# Patient Record
Sex: MALE | Race: WHITE | NOT HISPANIC OR LATINO | Employment: FULL TIME | URBAN - METROPOLITAN AREA
[De-identification: names, ages, dates, MRNs, and addresses within clinical notes are randomized per-mention and may not be internally consistent; named-entity substitution may affect disease eponyms.]

---

## 2018-04-09 ENCOUNTER — TELEPHONE (OUTPATIENT)
Dept: OTHER | Facility: CLINIC | Age: 33
End: 2018-04-09

## 2018-04-09 NOTE — TELEPHONE ENCOUNTER
4/9/2018    Call Regarding Onboarding [facility] [place] [facility] Other    Attempt 1    Message on voicemail     Comments:       Outreach   SANAZ

## 2018-04-26 NOTE — TELEPHONE ENCOUNTER
4/26/2018    Call Regarding Onboarding [facility] other    Attempt 2    Message on voicemail    Comments:       Outreach   Sydney Hammond

## 2018-06-15 ENCOUNTER — OFFICE VISIT (OUTPATIENT)
Dept: FAMILY MEDICINE | Facility: CLINIC | Age: 33
End: 2018-06-15

## 2018-06-15 VITALS
HEART RATE: 104 BPM | SYSTOLIC BLOOD PRESSURE: 146 MMHG | DIASTOLIC BLOOD PRESSURE: 86 MMHG | WEIGHT: 194 LBS | OXYGEN SATURATION: 96 % | BODY MASS INDEX: 26.86 KG/M2 | RESPIRATION RATE: 16 BRPM

## 2018-06-15 DIAGNOSIS — E78.5 HYPERLIPIDEMIA LDL GOAL <130: ICD-10-CM

## 2018-06-15 DIAGNOSIS — M54.16 LUMBAR BACK PAIN WITH RADICULOPATHY AFFECTING LEFT LOWER EXTREMITY: Primary | ICD-10-CM

## 2018-06-15 DIAGNOSIS — F17.200 TOBACCO USE DISORDER: ICD-10-CM

## 2018-06-15 DIAGNOSIS — Z23 VACCINE FOR DIPHTHERIA-TETANUS: ICD-10-CM

## 2018-06-15 DIAGNOSIS — F41.9 ANXIETY: ICD-10-CM

## 2018-06-15 DIAGNOSIS — M54.50 ACUTE LEFT-SIDED LOW BACK PAIN WITHOUT SCIATICA: ICD-10-CM

## 2018-06-15 DIAGNOSIS — M62.830 BACK MUSCLE SPASM: ICD-10-CM

## 2018-06-15 DIAGNOSIS — M54.6 ACUTE MIDLINE THORACIC BACK PAIN: ICD-10-CM

## 2018-06-15 DIAGNOSIS — Z11.4 ENCOUNTER FOR SCREENING FOR HIV: ICD-10-CM

## 2018-06-15 LAB
% GRANULOCYTES: 76.4 % (ref 42.2–75.2)
ERYTHROCYTE [SEDIMENTATION RATE] IN BLOOD: 18 MM/HR (ref 0–15)
HCT VFR BLD AUTO: 44.3 % (ref 39–51)
HEMOGLOBIN: 15 G/DL (ref 13.4–17.5)
LYMPHOCYTES NFR BLD AUTO: 18.9 % (ref 20.5–51.1)
MCH RBC QN AUTO: 30.4 PG (ref 27–31)
MCHC RBC AUTO-ENTMCNC: 33.9 G/DL (ref 33–37)
MCV RBC AUTO: 89.6 FL (ref 80–100)
MONOCYTES NFR BLD AUTO: 4.7 % (ref 1.7–9.3)
PLATELET # BLD AUTO: 326 K/UL (ref 140–450)
RBC # BLD AUTO: 4.94 X10/CMM (ref 4.2–5.9)
WBC # BLD AUTO: 10.3 X10/CMM (ref 3.8–11)

## 2018-06-15 PROCEDURE — 85025 COMPLETE CBC W/AUTO DIFF WBC: CPT | Performed by: FAMILY MEDICINE

## 2018-06-15 PROCEDURE — 36415 COLL VENOUS BLD VENIPUNCTURE: CPT | Performed by: FAMILY MEDICINE

## 2018-06-15 PROCEDURE — 90471 IMMUNIZATION ADMIN: CPT | Performed by: FAMILY MEDICINE

## 2018-06-15 PROCEDURE — 85651 RBC SED RATE NONAUTOMATED: CPT | Performed by: FAMILY MEDICINE

## 2018-06-15 PROCEDURE — 99214 OFFICE O/P EST MOD 30 MIN: CPT | Mod: 25 | Performed by: FAMILY MEDICINE

## 2018-06-15 PROCEDURE — 90715 TDAP VACCINE 7 YRS/> IM: CPT | Performed by: FAMILY MEDICINE

## 2018-06-15 RX ORDER — ACETAMINOPHEN AND CODEINE PHOSPHATE 300; 30 MG/1; MG/1
2 TABLET ORAL EVERY 6 HOURS PRN
Qty: 28 TABLET | Refills: 0 | Status: SHIPPED | OUTPATIENT
Start: 2018-06-15 | End: 2022-08-30

## 2018-06-15 RX ORDER — CYCLOBENZAPRINE HCL 10 MG
10 TABLET ORAL
Qty: 30 TABLET | Refills: 0 | Status: SHIPPED | OUTPATIENT
Start: 2018-06-15 | End: 2022-08-30

## 2018-06-15 RX ORDER — PREDNISONE 20 MG/1
TABLET ORAL
Qty: 20 TABLET | Refills: 0 | Status: SHIPPED | OUTPATIENT
Start: 2018-06-15 | End: 2022-08-30

## 2018-06-15 RX ORDER — LORAZEPAM 0.5 MG/1
0.5 TABLET ORAL EVERY 6 HOURS PRN
Qty: 30 TABLET | Refills: 0 | Status: SHIPPED | OUTPATIENT
Start: 2018-06-15 | End: 2022-08-30

## 2018-06-15 NOTE — PROGRESS NOTES
Back pain      SUBJECTIVE:   Daniel Greene is a 33 year old male who presents to clinic today for the following health issues:    White male wearing a hearing device.  Job  [employer]    Sleep Not Good  Appetite Good  Exercise No, some stretching    Smoking Yes 1/2 ppd tried Chantix helped, willing to try this again. Ordered today. Call before starter pack runs out to get a script for the continuation pack.  ETOH once a week  Street drugs/MJ No  Caffeine Coffee    Depression No  Anxiety NO  Panic NO  SI/SP NO  Hallucinations NO  Paranoid NO  Manic NO  OCD NO  PTSD NO  Gambling NO  Guns NO    HCM due  HIV done today  Tetanus done today    Back Pain       Duration: Saturday onset. Burning pain. Constant.     Knot in midback. Hard to stand. Worsening and now lower left side where it was previous. No Trauma, no fall, no fever. Bowel and bladder ok.    Tried heat and ice.        Specific cause: none    Description:   Location of pain: middle of back left  Character of pain: Stabbing  Pain radiation:radiates into the left buttocks and radiates into the left leg to feet  New numbness or weakness in legs, not attributed to pain:  no     Intensity: Currently 5-6/10    History:   Pain interferes with job: YES, sit for most of the day  History of back problems: History of Back problems  Any previous MRI or X-rays: Yes.  Date MRI 10 years ago  Sees a specialist for back pain:  No  Therapies tried without relief: ibuprofen 800 mg TID with food and acetaminophen (Tylenol)    Alleviating factors:   Improved by: lying down helps.    Precipitating factors:  Worsened by: Lifting, Bending, Standing and Sitting    Functional and Psychosocial Screen (Haylee STarT Back):      Not performed today    Imaging over 15 years ago.  Past PT and injection.  Previous Back Clinic on [facility]    Hearing impaired with device.          Accompanying Signs & Symptoms:  Risk of Fracture:  None  Risk of Cauda Equina:  None  Risk of  Infection:  None  Risk of Cancer:  None  Risk of Ankylosing Spondylitis:  Onset at age <35, male, AND morning back stiffness. no             Depression no  Anxiety some  Panic no  SI/SP no  Hallucinations no  Paranoid no  Manic no  OCD no  PTSD no  Gambling no  Chem dep no  Guns no          Problem list and histories reviewed & adjusted, as indicated.  Additional history: as documented    Patient Active Problem List   Diagnosis     Health Care Home     Cholesteatoma of ear     Back pain     Hearing loss- left ear     Heartburn     Tobacco use     Past Surgical History:   Procedure Laterality Date     LASER DIODE TYMPANOMASTOIDECTOMY Right 11/4/2015    Procedure: LASER DIODE TYMPANOMASTOIDECTOMY;  Surgeon: Augustina Reilly MD;  Location: [facility]     MASTOID SURGERY Right 1995    [facility]     MASTOIDECTOMY Left 3/25/2015    Procedure: MASTOIDECTOMY;  Surgeon: Augustina Reilly MD;  Location:  OR       Social History   Substance Use Topics     Smoking status: Current Some Day Smoker     Smokeless tobacco: Not on file     Alcohol use 0.0 oz/week     0 Standard drinks or equivalent per week     No family history on file.      Current Outpatient Prescriptions   Medication Sig Dispense Refill     acetaminophen (TYLENOL) 325 MG tablet Take 325-650 mg by mouth every 6 hours as needed for mild pain       acetaminophen-codeine (TYLENOL W/CODEINE NO. 3) 300-30 MG per tablet Take 2 tablets by mouth every 6 hours as needed for pain 28 tablet 0     ASPIRIN PO Take 325 mg by mouth       cyclobenzaprine (FLEXERIL) 10 MG tablet Take 1 tablet (10 mg) by mouth nightly as needed for muscle spasms 14 tablet 0     LORazepam (ATIVAN) 0.5 MG tablet Take 1 tablet (0.5 mg) by mouth every 6 hours as needed for anxiety 30 tablet 0     varenicline (CHANTIX STARTING MONTH PAK) 0.5 MG X 11 & 1 MG X 42 tablet Take 0.5 mg tab daily for 3 days, then 0.5 mg tab twice daily for 4 days, then 1 mg twice daily. 53 tablet 0     No Known  "Allergies  No lab results found.   BP Readings from Last 3 Encounters:   06/15/18 146/86   08/12/16 (!) 146/100   11/04/15 118/73    Wt Readings from Last 3 Encounters:   06/15/18 88 kg (194 lb)   08/12/16 86.6 kg (191 lb)   11/04/15 85.8 kg (189 lb 2.5 oz)       Estimated body mass index is 26.86 kg/(m^2) as calculated from the following:    Height as of 11/4/15: 1.81 m (5' 11.26\").    Weight as of this encounter: 88 kg (194 lb).             Labs reviewed in EPIC    Reviewed and updated as needed this visit by clinical staff       Reviewed and updated as needed this visit by Provider         ROS:  Constitutional, HEENT, cardiovascular, pulmonary, GI, , musculoskeletal, neuro, skin, endocrine and psych systems are negative, except as otherwise noted.    OBJECTIVE:     /86  Pulse 104  Resp 16  Wt 88 kg (194 lb)  SpO2 96%  BMI 26.86 kg/m2  There is no height or weight on file to calculate BMI.  GENERAL: healthy, alert and no distress White male with hearing device  Push off from chair, stiffly stands uncomfortably for exam. Can get on/off the exam table without assistance. Gait in hallway not observed.  EYES: Eyes grossly normal to inspection, PERRL and conjunctivae and sclerae normal  HENT: ear canals and TM's normal, nose and mouth without ulcers or lesions  NECK: no adenopathy, no asymmetry, masses, or scars and thyroid normal to palpation  RESP: lungs clear to auscultation - no rales, rhonchi or wheezes  CV: regular rate and rhythm, normal S1 S2, no S3 or S4, no murmur, click or rub, no peripheral edema and peripheral pulses strong  ABDOMEN: soft, nontender, no hepatosplenomegaly, no masses and bowel sounds normal  MS: no gross new musculoskeletal defects noted except for back exam below, no edema  SKIN: no suspicious lesions or rashes  NEURO: Normal strength and tone, mentation intact and speech normal  PSYCH: mentation appears normal, affect normal/bright  LYMPH: no cervical, supraclavicular, " "axillary, or inguinal adenopathy  Back exam  Spine  Tender, no redness or warmth. Thoracic tender, then better low thoracic upper lumbar,  and then tender again low lumbar  ROM of back  Flexion good  Extension poor  Lateral bending better to the right then left  Twist fair  Straight leg raising positive on the left  DTRs present bilateral at knees  No sensation changes      Diagnostic Test Results:  Results for orders placed or performed during the hospital encounter of 11/04/15   Surgical pathology exam   Result Value Ref Range    Copath Report       Patient Name: ANGIE LICONA  MR#: [id number]  Specimen #: [id number]  Collected: 11/4/2015  Received: 11/4/2015  Reported: 11/9/2015 10:49  Ordering Phy(s): ONUR QUEEN    SPECIMEN(S):  Right mastoid contents    FINAL DIAGNOSIS:  RIGHT MASTOID CONTENTS, EXCISION:  - Fragments of keratinized stratified squamous epithelium and keratin  debris, consistent with keratoma (cholesteatoma).    I have personally reviewed all specimens and or slides, including the  listed special stains, and used them with my medical judgement to  determine the final diagnosis.    Electronically signed out by:    Yenifer Joiner M.D., [facility]    CLINICAL HISTORY:  Cholesteatoma    GROSS:  The specimen is received in formalin with proper patient identification,  labeled \"right mastoid contents\".  The specimen consists of multiple  fragments of friable gray to white tissue measuring 4.2 x 3.9 x 1.7 cm  in aggregate.  The fragments are serially sectioned and the specimen is  entirely submitted in ca ssettes 1-3. (Dictated by: Keshawn Queen  11/5/2015 11:39 AM)    MICROSCOPIC:  Microscopic examination is performed.    CPT Codes:  A: 25388-KZ3    TESTING LAB LOCATION:  [facility], [address]   [address]  [phone]    COLLECTION SITE:  Client: [facility], " [place]  Location: [facility] (B)         ASSESSMENT/PLAN:     ASSESSMENT / PLAN:  (M54.17) Lumbar back pain with radiculopathy affecting left lower extremity  (primary encounter diagnosis)  Comment:   Plan: MR Lumbar Spine w/o Contrast, predniSONE         (DELTASONE) 20 MG tablet, ToxASSURE Urine Drug         Screen ([facility]), CBC with Diff/Plt ([facility]), Sed         Rate Westergren ([facility]), C-Reactive Protein          Quant ([facility])            (F17.200) Tobacco use disorder  Comment:   Plan: varenicline (CHANTIX STARTING MONTH PAK) 0.5 MG        X 11 & 1 MG X 42 tablet            (M54.6) Acute midline thoracic back pain  Comment:  Plan: MR Thoracic Spine w/o Contrast, predniSONE         (DELTASONE) 20 MG tablet, ToxASSURE Urine Drug         Screen ([facility]), CBC with Diff/Plt ([facility]), Sed         Rate Westergren ([facility]), C-Reactive Protein          Quant ([facility])            (M54.5) Acute left-sided low back pain without sciatica  Comment:   Plan: acetaminophen-codeine (TYLENOL W/CODEINE NO. 3)        300-30 MG per tablet, cyclobenzaprine         (FLEXERIL) 10 MG tablet            (F41.9) Anxiety  Comment:   Plan: LORazepam (ATIVAN) 0.5 MG tablet        Contract today, toxassure    (Z68.26) BMI 26.0-26.9,adult  Comment: consider weight loss  Plan: Comp. Metabolic Panel (14) ([facility])            (Z23) Vaccine for diphtheria-tetanus  Comment: update  Plan: TDAP VACCINE (BOOSTRIX), ADMIN 1st VACCINE            (Z11.4) Encounter for screening for HIV  Comment: routine screen  Plan: HIV 1/0/2 Rflx ([facility])            (E78.5) Hyperlipidemia LDL goal <130  Comment: non fasting  Plan: Lipid Panel ([facility])            (M62.830) Back muscle spasm  Comment:   Plan: Flexeril        Patient Instructions   Schedule MRI thoracic and lumbar    Prednisone for inflammation. NO Ibuprofen or Alleve with this.  T#3 for pain  Flexeril for muscle spasm  Heat or Ice ok    Two weeks follow up    Tetanus vaccine    Labs today    Chantix for smoking  "cessation.   Smoking Cessation Ideas    1. Consider hiding your cigarettes in an inconvenient place like the freezer, the trunk of your car etc.    2. Consider using gum, candy, toothpicks instead. Many smokers are oral people who like things in their mouth and hands.    3. Consider using an elastic/rubber band and snapping it when you want a cigarette to remind you to pause and consider if you want to make the choice to smoke. This is also a negative feedback tool.    4. Consider cutting the cigarette in half.    5. Consider portioning out your allotment for the day in a ziplock bag so that you can see what your limit is for the day. Then put the pack somewhere inconvenient.    6. Set a quit date.    7. Consider when and where and who you smoke with. Consider changing those behaviors. For example \"everytime I have coffee, I smoke\" try and separate these events from each other.    8. Consider setting longer time limits before your first or next cigarette. You could use a timer if needed.                Liane Espino MD  [facility]    "

## 2018-06-15 NOTE — PATIENT INSTRUCTIONS
"Schedule MRI thoracic and lumbar    Prednisone for inflammation. NO Ibuprofen or Alleve with this.  T#3 for pain  Flexeril for muscle spasm  Heat or Ice ok    Two weeks follow up    Tetanus vaccine    Labs today    Chantix for smoking cessation.   Smoking Cessation Ideas    1. Consider hiding your cigarettes in an inconvenient place like the freezer, the trunk of your car etc.    2. Consider using gum, candy, toothpicks instead. Many smokers are oral people who like things in their mouth and hands.    3. Consider using an elastic/rubber band and snapping it when you want a cigarette to remind you to pause and consider if you want to make the choice to smoke. This is also a negative feedback tool.    4. Consider cutting the cigarette in half.    5. Consider portioning out your allotment for the day in a ziplock bag so that you can see what your limit is for the day. Then put the pack somewhere inconvenient.    6. Set a quit date.    7. Consider when and where and who you smoke with. Consider changing those behaviors. For example \"everytime I have coffee, I smoke\" try and separate these events from each other.    8. Consider setting longer time limits before your first or next cigarette. You could use a timer if needed.            "

## 2018-06-15 NOTE — MR AVS SNAPSHOT
"              After Visit Summary   6/15/2018    Daniel Greene    MRN: [id number]           Patient Information     Date Of Birth          1985        Visit Information        Provider Department      6/15/2018 1:45 PM Liane Espino MD [facility]        Today's Diagnoses     Lumbar back pain with radiculopathy affecting left lower extremity    -  1    Tobacco use disorder        Acute midline thoracic back pain        Acute left-sided low back pain without sciatica        Anxiety        BMI 26.0-26.9,adult        Vaccine for diphtheria-tetanus        Encounter for screening for HIV        Hyperlipidemia LDL goal <130        Back muscle spasm          Care Instructions    Schedule MRI thoracic and lumbar    Prednisone for inflammation. NO Ibuprofen or Alleve with this.  T#3 for pain  Flexeril for muscle spasm  Heat or Ice ok    Two weeks follow up    Tetanus vaccine    Labs today    Chantix for smoking cessation.   Smoking Cessation Ideas    1. Consider hiding your cigarettes in an inconvenient place like the freezer, the trunk of your car etc.    2. Consider using gum, candy, toothpicks instead. Many smokers are oral people who like things in their mouth and hands.    3. Consider using an elastic/rubber band and snapping it when you want a cigarette to remind you to pause and consider if you want to make the choice to smoke. This is also a negative feedback tool.    4. Consider cutting the cigarette in half.    5. Consider portioning out your allotment for the day in a ziplock bag so that you can see what your limit is for the day. Then put the pack somewhere inconvenient.    6. Set a quit date.    7. Consider when and where and who you smoke with. Consider changing those behaviors. For example \"everytime I have coffee, I smoke\" try and separate these events from each other.    8. Consider setting longer time limits before your first or next cigarette. You could use a timer if " needed.                    Follow-ups after your visit        Who to contact     If you have questions or need follow up information about today's clinic visit or your schedule please contact [facility] directly at [phone].  Normal or non-critical lab and imaging results will be communicated to you by MyChart, letter or phone within 4 business days after the clinic has received the results. If you do not hear from us within 7 days, please contact the clinic through [employer] or phone. If you have a critical or abnormal lab result, we will notify you by phone as soon as possible.  Submit refill requests through [employer] or call your pharmacy and they will forward the refill request to us. Please allow 3 business days for your refill to be completed.          Additional Information About Your Visit        [employer] Information     [employer] gives you secure access to your electronic health record. If you see a primary care provider, you can also send messages to your care team and make appointments. If you have questions, please call your primary care clinic.  If you do not have a primary care provider, please call [phone] and they will assist you.        Care EveryWhere ID     This is your Care EveryWhere ID. This could be used by other organizations to access your [facility] medical records  [id number]        Your Vitals Were     Pulse Respirations Pulse Oximetry BMI (Body Mass Index)          104 16 96% 26.86 kg/m2         Blood Pressure from Last 3 Encounters:   06/15/18 146/86   08/12/16 (!) 146/100   11/04/15 118/73    Weight from Last 3 Encounters:   06/15/18 88 kg (194 lb)   08/12/16 86.6 kg (191 lb)   11/04/15 85.8 kg (189 lb 2.5 oz)              We Performed the Following     ADMIN 1st VACCINE     C-Reactive Protein  Quant ([facility])     CBC with Diff/Plt ([facility])     Comp. Metabolic Panel (14) ([facility])     HIV 1/0/2 Rflx ([facility])     Lipid Panel ([facility])     Sed Rate Westergren ([facility])     TDAP  VACCINE (BOOSTRIX)     ToxASSURE Urine Drug Screen ([facility])          Today's Medication Changes          These changes are accurate as of 6/15/18  2:21 PM.  If you have any questions, ask your nurse or doctor.               Start taking these medicines.        Dose/Directions    predniSONE 20 MG tablet   Commonly known as:  DELTASONE   Used for:  Lumbar back pain with radiculopathy affecting left lower extremity, Acute midline thoracic back pain   Started by:  Liane Espino MD        Take 3 tabs (60 mg) by mouth daily x 3 days, 2 tabs (40 mg) daily x 3 days, 1 tab (20 mg) daily x 3 days, then 1/2 tab (10 mg) x 3 days.   Quantity:  20 tablet   Refills:  0            Where to get your medicines      These medications were sent to [facility] [address] [place] [address] & [address], [place] [address]     Phone:  [phone]     cyclobenzaprine 10 MG tablet    predniSONE 20 MG tablet    varenicline 0.5 MG X 11 & 1 MG X 42 tablet         Some of these will need a paper prescription and others can be bought over the counter.  Ask your nurse if you have questions.     Bring a paper prescription for each of these medications     acetaminophen-codeine 300-30 MG per tablet    LORazepam 0.5 MG tablet               Information about OPIOIDS     PRESCRIPTION OPIOIDS: WHAT YOU NEED TO KNOW   We gave you an opioid (narcotic) pain medicine. It is important to manage your pain, but opioids are not always the best choice. You should first try all the other options your care team gave you. Take this medicine for as short a time (and as few doses) as possible.     These medicines have risks:    DO NOT drive when on new or higher doses of pain medicine. These medicines can affect your alertness and reaction times, and you could be arrested for driving under the influence (DUI). If you need to use opioids long-term, talk to your care team about driving.    DO NOT operate  heave machinery    DO NOT do any other dangerous activities while taking these medicines.     DO NOT drink any alcohol while taking these medicines.      If the opioid prescribed includes acetaminophen, DO NOT take with any other medicines that contain acetaminophen. Read all labels carefully. Look for the word  acetaminophen  or  Tylenol.  Ask your pharmacist if you have questions or are unsure.    You can get addicted to pain medicines, especially if you have a history of addiction (chemical, alcohol or substance dependence). Talk to your care team about ways to reduce this risk.    Store your pills in a secure place, locked if possible. We will not replace any lost or stolen medicine. If you don t finish your medicine, please throw away (dispose) as directed by your pharmacist. The [place] Pollution Control Agency has more information about safe disposal: https://www.pca.[facility].[place].us/living-green/managing-unwanted-medications.     All opioids tend to cause constipation. Drink plenty of water and eat foods that have a lot of fiber, such as fruits, vegetables, prune juice, apple juice and high-fiber cereal. Take a laxative (Miralax, milk of magnesia, Colace, Senna) if you don t move your bowels at least every other day.          Primary Care Provider Office Phone # Fax #    Rasta Patton [phone] [address]  [facility] [address]        Equal Access to Services     BRYANNA AUSTIN AH: Hadii aad ku hadasho Soomaali, waaxda luqadaha, qaybta kaalmada adeegyada, vitor gonzalez hayalphonso francisco. So [facility] [phone].    ATENCIÓN: Si habla español, tiene a macias disposición servicios gratuitos de asistencia lingüística. Llame al [phone].    We comply with applicable federal civil rights laws and [place] laws. We do not discriminate on the basis of race, color, national origin, age, disability, sex, sexual orientation, or gender identity.            Thank you!     Thank you  for choosing [facility]  for your care. Our goal is always to provide you with excellent care. Hearing back from our patients is one way we can continue to improve our services. Please take a few minutes to complete the written survey that you may receive in the mail after your visit with us. Thank you!             Your Updated Medication List - Protect others around you: Learn how to safely use, store and throw away your medicines at www.disposemymeds.org.          This list is accurate as of 6/15/18  2:21 PM.  Always use your most recent med list.                   Brand Name Dispense Instructions for use Diagnosis    acetaminophen-codeine 300-30 MG per tablet    TYLENOL w/CODEINE No. 3    28 tablet    Take 2 tablets by mouth every 6 hours as needed for pain    Acute left-sided low back pain without sciatica       ASPIRIN PO      Take 325 mg by mouth        cyclobenzaprine 10 MG tablet    FLEXERIL    30 tablet    Take 1 tablet (10 mg) by mouth nightly as needed for muscle spasms    Acute left-sided low back pain without sciatica       LORazepam 0.5 MG tablet    ATIVAN    30 tablet    Take 1 tablet (0.5 mg) by mouth every 6 hours as needed for anxiety    Anxiety       predniSONE 20 MG tablet    DELTASONE    20 tablet    Take 3 tabs (60 mg) by mouth daily x 3 days, 2 tabs (40 mg) daily x 3 days, 1 tab (20 mg) daily x 3 days, then 1/2 tab (10 mg) x 3 days.    Lumbar back pain with radiculopathy affecting left lower extremity, Acute midline thoracic back pain       TYLENOL 325 MG tablet   Generic drug:  acetaminophen      Take 325-650 mg by mouth every 6 hours as needed for mild pain        varenicline 0.5 MG X 11 & 1 MG X 42 tablet    CHANTIX STARTING MONTH ARNOL    53 tablet    Take 0.5 mg tab daily for 3 days, then 0.5 mg tab twice daily for 4 days, then 1 mg twice daily.    Tobacco use disorder

## 2018-06-15 NOTE — LETTER
[facility]    06/15/18    Patient: Daniel Greene  YOB: 1985  Medical Record Number: [id number]                                                                  Controlled Substance Agreement  I understand that my care provider has prescribed controlled substances (narcotics, tranquilizers, and/or stimulants) to help manage my condition(s).  I am taking this medicine to help me function or work.  I know that this is strong medicine.  It could have serious side effects and even cause a dependency on the drug.  If I stop these medicines suddenly, I could have severe withdrawal symptoms.    The risks, benefits, and side effects of these medication(s) were explained to me.  I agree that:  1. I will take part in other treatments as advised by my provider.  This may be psychiatry or counseling, physical therapy, behavioral therapy, group treatment, or a referral to a pain clinic.  I will reduce or stop my medicine when my provider tells me to do so.   2. I will take my medicines as prescribed.  I will not change the dose or schedule unless my provider tells me to.  There will be no refills if I  run out early.   I may be contacted at any time without warning and asked to complete a drug test or pill count.   3. I will keep all my appointments at the clinic.  If I miss appointments or fail to follow instructions, my provider may stop my medicine.  4. I will not ask other providers to prescribe controlled substances. And I will not accept controlled substances from other people. If I need another prescribed controlled substance for a new reason, I will notify my provider within one business day.  5. If I enroll in the [place] Medical Marijuana program, I will tell my provider.  I will also sign an agreement to share my medical records with my provider.  6. I will use one pharmacy to fill all of my controlled substance prescriptions.  If my prescription is mailed to my pharmacy, it may take 5 to 7  days for my medicine to be ready.  7. I understand that my provider, clinic care team, and pharmacy can track controlled substance prescriptions from other providers through a central database (prescription monitoring program).  8. I will bring in my list of medications (or my medicine bottles) each time I come to the clinic.  REV-  04/2016                                                                                                                                            Page 1 of 2      [facility]    06/15/18    Patient: Daniel Greene  YOB: 1985  Medical Record Number: [id number]    9. Refills of controlled substances will be made only during office hours.  It is up to me to make sure that I do not run out of my medicines on weekends or holidays.    10. I am responsible for my prescriptions.  If the medicine is lost or stolen, it will not be replaced.   I also agree not to share these medicines with anyone.  11. I agree to not use ANY illegal or recreational drugs.  This includes marijuana, cocaine, bath salts or other drugs.  I agree not to use alcohol unless my provider says I may.  I agree to give urine samples whenever asked.  If I fail to give a urine sample, the provider may stop my medicine.     12. I will tell my nurse or provider right away if I become pregnant or have a new medical problem treated outside of [facility].  13. I understand that this medicine can affect my thinking and judgment.  It may be unsafe for me to drive, use machinery and do dangerous tasks.  I will not do any of these things until I know how the medicine affects me.  If my dose changes, I will wait to see how it affects me.  I will contact my provider if I have concerns about medicine side effects.  I understand that if I do not follow any of the conditions above, my prescriptions or treatment may be stopped.    I agree that my provider, clinic care team, and pharmacy may work with any [facility],  state or federal law enforcement agency that investigates the misuse, sale, or other diversion of my controlled medicine. I will allow my provider to discuss my care with or share a copy of this agreement with any other treating provider, pharmacy or emergency room where I receive care.  I agree to give up (waive) any right of privacy or confidentiality with respect to these authorizations.   I have read this agreement and have asked questions about anything I did not understand.   ___________________________________    ___________________________  Patient Signature                                                           Date and Time  ___________________________________     ____________________________  Witness                                                                            Date and Time  ___________________________________  Liane Espino MD  REV-  04/2016                                                                                                                                                                 Page 2 of 2

## 2018-06-16 LAB
ALBUMIN SERPL-MCNC: 5 G/DL (ref 3.5–5.5)
ALBUMIN/GLOB SERPL: 1.7 {RATIO} (ref 1.2–2.2)
ALP SERPL-CCNC: 95 IU/L (ref 39–117)
ALT SERPL-CCNC: 125 IU/L (ref 0–44)
AST SERPL-CCNC: 57 IU/L (ref 0–40)
BILIRUB SERPL-MCNC: 0.4 MG/DL (ref 0–1.2)
BUN SERPL-MCNC: 12 MG/DL (ref 6–20)
BUN/CREATININE RATIO: 14 (ref 9–20)
CALCIUM SERPL-MCNC: 10.3 MG/DL (ref 8.7–10.2)
CHLORIDE SERPLBLD-SCNC: 97 MMOL/L (ref 96–106)
CHOLEST SERPL-MCNC: 238 MG/DL (ref 100–199)
CREAT SERPL-MCNC: 0.84 MG/DL (ref 0.76–1.27)
CRP SERPL-MCNC: 12.6 MG/L (ref 0–4.9)
EGFR IF AFRICN AM: 132 ML/MIN/1.73
EGFR IF NONAFRICN AM: 114 ML/MIN/1.73
GLOBULIN, TOTAL: 3 G/DL (ref 1.5–4.5)
GLUCOSE SERPL-MCNC: 97 MG/DL (ref 65–99)
HDLC SERPL-MCNC: 61 MG/DL
HIV SCREEN 4TH GEN WITH RFLX: NON REACTIVE
LDL/HDL RATIO: 2.1 RATIO (ref 0–3.6)
LDLC SERPL CALC-MCNC: 131 MG/DL (ref 0–99)
POTASSIUM SERPL-SCNC: 4 MMOL/L (ref 3.5–5.2)
PROT SERPL-MCNC: 8 G/DL (ref 6–8.5)
SODIUM SERPL-SCNC: 137 MMOL/L (ref 134–144)
TOTAL CO2: 22 MMOL/L (ref 20–29)
TRIGL SERPL-MCNC: 228 MG/DL (ref 0–149)
VLDLC SERPL CALC-MCNC: 46 MG/DL (ref 5–40)

## 2018-06-18 NOTE — PROGRESS NOTES
Cbc was ok with minor changes.  SED rate and CRP are  non specific inflammation test was mildly elevated.  HIV was negative.  CMP was ok. Slight increased liver tests and calcium.  Avoid alcohol and NSAIDS like Ibuprofen/Alleve.  Lipids are high and need work. Eat more fish, fish oil, ground flax seed and oatmeal. Eat less sugars. Recheck fasting in 3 months.

## 2018-06-20 LAB — SUMMARY OF DRUGS IDENTIFIED: NORMAL

## 2018-07-03 NOTE — PROGRESS NOTES
"  SUBJECTIVE:   Daniel Greene is a 33 year old male who presents to clinic today for the following health issues:  Hearing device as before    Back pain is good  Prednisone ended last Thur/Fri  Pain 0/10  \"Can touch toes again\"  No PT  Discussed proper lifting and not overdoing it. He turned down helping a friend move a bed.    Sleep ok  Appetite ok  Exercise walking 30-45 min  Standing all day some soreness    Fall/Seizure/LOC no    Smoking yes today<1/2 pk Chantix started this morning Planning on quitting.  ETOH happy hour 4, beer varies occasional 6 pk (try to stay a 3 per day)(discussed empty calories of alcohol- he is trying to lose weight)  Street drugs/MJ no  Caffeine yes coffee    Depression no  Anxiety no  Panic some times in a crowd, sometimes in the car  SI/SP no  Hallucinations no  Paranoid no  Manic no  OCD no  PTSD no   Gambling no  Guns no    Cough occasional   ROS otherwise negative    Problem list and histories reviewed & adjusted, as indicated.  Additional history: as documented    Patient Active Problem List   Diagnosis     Health Care Home     Cholesteatoma of ear     Back pain     Hearing loss- left ear     Heartburn     Tobacco use     Past Surgical History:   Procedure Laterality Date     LASER DIODE TYMPANOMASTOIDECTOMY Right 11/4/2015    Procedure: LASER DIODE TYMPANOMASTOIDECTOMY;  Surgeon: Augustina Reilly MD;  Location: [facility] OR     MASTOID SURGERY Right 1995    [facility]     MASTOIDECTOMY Left 3/25/2015    Procedure: MASTOIDECTOMY;  Surgeon: Augustina Rielly MD;  Location:  OR       Social History   Substance Use Topics     Smoking status: Current Some Day Smoker     Smokeless tobacco: Never Used     Alcohol use 0.0 oz/week     0 Standard drinks or equivalent per week     No family history on file.      Current Outpatient Prescriptions   Medication Sig Dispense Refill     acetaminophen (TYLENOL) 325 MG tablet Take 325-650 mg by mouth every 6 hours as needed for mild pain       " ASPIRIN PO Take 325 mg by mouth       cyclobenzaprine (FLEXERIL) 10 MG tablet Take 1 tablet (10 mg) by mouth nightly as needed for muscle spasms 30 tablet 0     LORazepam (ATIVAN) 0.5 MG tablet Take 1 tablet (0.5 mg) by mouth every 6 hours as needed for anxiety 30 tablet 0     varenicline (CHANTIX STARTING MONTH ARNOL) 0.5 MG X 11 & 1 MG X 42 tablet Take 0.5 mg tab daily for 3 days, then 0.5 mg tab twice daily for 4 days, then 1 mg twice daily. 53 tablet 0     acetaminophen-codeine (TYLENOL W/CODEINE NO. 3) 300-30 MG per tablet Take 2 tablets by mouth every 6 hours as needed for pain (Patient not taking: Reported on 7/6/2018) 28 tablet 0     predniSONE (DELTASONE) 20 MG tablet Take 3 tabs (60 mg) by mouth daily x 3 days, 2 tabs (40 mg) daily x 3 days, 1 tab (20 mg) daily x 3 days, then 1/2 tab (10 mg) x 3 days. 20 tablet 0     No Known Allergies  Recent Labs   Lab Test  06/15/18   1503   LDL  131*   HDL  61   TRIG  228*   ALT  125*   CR  0.84   POTASSIUM  4.0      BP Readings from Last 3 Encounters:   07/06/18 136/82   06/15/18 146/86   08/12/16 (!) 146/100    Wt Readings from Last 3 Encounters:   07/06/18 92.1 kg (203 lb)   06/15/18 88 kg (194 lb)   08/12/16 86.6 kg (191 lb)                  Labs reviewed in EPIC    Reviewed and updated as needed this visit by clinical staff       Reviewed and updated as needed this visit by Provider         ROS:  Constitutional, HEENT, cardiovascular, pulmonary, GI, , musculoskeletal, neuro, skin, endocrine and psych systems are negative, except as otherwise noted.    OBJECTIVE:     /82  Pulse 88  Resp 16  Wt 92.1 kg (203 lb)  BMI 28.11 kg/m2  Body mass index is 28.11 kg/(m^2).  GENERAL: healthy, alert and no distress  EYES: Eyes grossly normal to inspection, PERRL and conjunctivae and sclerae normal  HENT: ear canals and TM's normal, nose and mouth without ulcers or lesions  RESP: lungs clear to auscultation - no rales, rhonchi or wheezes  CV: regular rate and rhythm,  normal S1 S2, no S3 or S4, no murmur, click or rub, no peripheral edema and peripheral pulses strong  ABDOMEN: soft, nontender, no hepatosplenomegaly, no masses and bowel sounds normal  MS: no gross musculoskeletal defects noted, no edema  SKIN: no suspicious lesions or rashes  NEURO: Normal strength and tone, mentation intact and speech normal  PSYCH: mentation appears normal, affect normal/bright  LYMPH: no cervical, supraclavicular, axillary, or inguinal adenopathy    Diagnostic Test Results:  Results for orders placed or performed in visit on 06/15/18   HIV 1/0/2 Rflx ([facility])   Result Value Ref Range    HIV Screen 4th Gen with Rflx Non Reactive Non Reactive    Narrative    Performed at:  01 - [facility] [address], [place], [place]  [id number]  : José Miguel William MD, Phone:  [phone]   ToxASSURE Urine Drug Screen ([facility])   Result Value Ref Range    Summary of Drugs Identified FINAL     Narrative    Performed at:  01  [facility]  [address] [place] [address]  [id number]  : Kade Javier MD, Phone:  [phone]   Comp. Metabolic Panel (14) ([facility])   Result Value Ref Range    Glucose 97 65 - 99 mg/dL    Urea Nitrogen 12 6 - 20 mg/dL    Creatinine 0.84 0.76 - 1.27 mg/dL    eGFR If NonAfricn Am 114 >59 mL/min/1.73    eGFR If Africn Am 132 >59 mL/min/1.73    BUN/Creatinine Ratio 14 9 - 20    Sodium 137 134 - 144 mmol/L    Potassium 4.0 3.5 - 5.2 mmol/L    Chloride 97 96 - 106 mmol/L    Total CO2 22 20 - 29 mmol/L    Calcium 10.3 (H) 8.7 - 10.2 mg/dL    Protein Total 8.0 6.0 - 8.5 g/dL    Albumin 5.0 3.5 - 5.5 g/dL    Globulin, Total 3.0 1.5 - 4.5 g/dL    A/G Ratio 1.7 1.2 - 2.2    Bilirubin Total 0.4 0.0 - 1.2 mg/dL    Alkaline Phosphatase 95 39 - 117 IU/L    AST 57 (H) 0 - 40 IU/L     (H) 0 - 44 IU/L    Narrative    Performed at:  [facility]  [address] [facility], [place], [place]  [id number]  : José Miguel William MD, Phone:  [phone]   Lipid Panel  "([facility])   Result Value Ref Range    Cholesterol 238 (H) 100 - 199 mg/dL    Triglycerides 228 (H) 0 - 149 mg/dL    HDL Cholesterol 61 >39 mg/dL    VLDL Cholesterol Marc 46 (H) 5 - 40 mg/dL    LDL Cholesterol Calculated 131 (H) 0 - 99 mg/dL    LDL/HDL Ratio 2.1 0.0 - 3.6 ratio    Narrative    Performed at:  01  [facility] [address], [place], [place]  [id number]  : José Miguel William MD, Phone:  [phone]   CBC with Diff/Plt ([facility])   Result Value Ref Range    WBC x10/cmm 10.3 3.8 - 11.0 x10/cmm    % Lymphocytes 18.9 (A) 20.5 - 51.1 %    % Monocytes 4.7 1.7 - 9.3 %    % Granulocytes 76.4 (A) 42.2 - 75.2 %    RBC x10/cmm 4.94 4.2 - 5.9 x10/cmm    Hemoglobin 15.0 13.4 - 17.5 g/dl    Hematocrit 44.3 39 - 51 %    MCV 89.6 80 - 100 fL    MCH 30.4 27.0 - 31.0 pg    MCHC 33.9 33.0 - 37.0 g/dL    Platelet Count 326 140 - 450 K/uL   Sed Rate Westergren ([facility])   Result Value Ref Range    Sed Rate 18 (A) 0 - 15 mm/hr   C-Reactive Protein  Quant ([facility])   Result Value Ref Range    C-Reactive Protein 12.6 (H) 0.0 - 4.9 mg/L    Narrative    Performed at:  01 - [facility] [address], [place], [place]  [id number]  : José Miguel William MD, Phone:  [phone]       ASSESSMENT/PLAN:     ASSESSMENT / PLAN:  (Z87.39) History of back pain  (primary encounter diagnosis)  Comment: Pain has resolved with prednisone  Plan: back care maintenance discussed    (F17.200) Tobacco use disorder  Comment:   Plan: started his Chantix today    Estimated body mass index is 28.11 kg/(m^2) as calculated from the following:    Height as of 11/4/15: 1.81 m (5' 11.26\").    Weight as of this encounter: 92.1 kg (203 lb).  Planning weight loss        Patient Instructions     Smoking cessation    Watch lifting mechanics    Continue stretching    Weight loss  Diet more salad, chicken etc  Wt Readings from Last 4 Encounters:   07/06/18 92.1 kg (203 lb)   06/15/18 88 kg (194 lb)   08/12/16 86.6 kg (191 lb)   11/04/15 85.8 kg (189 lb " "2.5 oz)     Estimated body mass index is 28.11 kg/(m^2) as calculated from the following:    Height as of 11/4/15: 1.81 m (5' 11.26\").    Weight as of this encounter: 92.1 kg (203 lb).    He will f/u 3 months  Liane Espino MD  [facility]    "

## 2018-07-06 ENCOUNTER — OFFICE VISIT (OUTPATIENT)
Dept: FAMILY MEDICINE | Facility: CLINIC | Age: 33
End: 2018-07-06

## 2018-07-06 VITALS
HEART RATE: 88 BPM | DIASTOLIC BLOOD PRESSURE: 82 MMHG | SYSTOLIC BLOOD PRESSURE: 136 MMHG | BODY MASS INDEX: 28.11 KG/M2 | WEIGHT: 203 LBS | RESPIRATION RATE: 16 BRPM

## 2018-07-06 DIAGNOSIS — F17.200 TOBACCO USE DISORDER: ICD-10-CM

## 2018-07-06 DIAGNOSIS — Z87.39 HISTORY OF BACK PAIN: Primary | ICD-10-CM

## 2018-07-06 PROCEDURE — 99214 OFFICE O/P EST MOD 30 MIN: CPT | Performed by: FAMILY MEDICINE

## 2018-07-06 NOTE — PATIENT INSTRUCTIONS
"Smoking cessation    Watch lifting mechanics    Continue stretching    Weight loss  Diet more salad, chicken etc  Wt Readings from Last 4 Encounters:   07/06/18 92.1 kg (203 lb)   06/15/18 88 kg (194 lb)   08/12/16 86.6 kg (191 lb)   11/04/15 85.8 kg (189 lb 2.5 oz)     Estimated body mass index is 28.11 kg/(m^2) as calculated from the following:    Height as of 11/4/15: 1.81 m (5' 11.26\").    Weight as of this encounter: 92.1 kg (203 lb).    "

## 2018-07-06 NOTE — MR AVS SNAPSHOT
"              After Visit Summary   7/6/2018    Daniel Greene    MRN: [id number]           Patient Information     Date Of Birth          1985        Visit Information        Provider Department      7/6/2018 3:30 PM Liane Espino MD [facility]        Care Instructions    Smoking cessation    Watch lifting mechanics    Continue stretching    Weight loss  Diet more salad, chicken etc  Wt Readings from Last 4 Encounters:   07/06/18 92.1 kg (203 lb)   06/15/18 88 kg (194 lb)   08/12/16 86.6 kg (191 lb)   11/04/15 85.8 kg (189 lb 2.5 oz)     Estimated body mass index is 28.11 kg/(m^2) as calculated from the following:    Height as of 11/4/15: 1.81 m (5' 11.26\").    Weight as of this encounter: 92.1 kg (203 lb).            Follow-ups after your visit        Who to contact     If you have questions or need follow up information about today's clinic visit or your schedule please contact [facility] directly at [phone].  Normal or non-critical lab and imaging results will be communicated to you by [facility], letter or phone within 4 business days after the clinic has received the results. If you do not hear from us within 7 days, please contact the clinic through [facility] or phone. If you have a critical or abnormal lab result, we will notify you by phone as soon as possible.  Submit refill requests through [facility] or call your pharmacy and they will forward the refill request to us. Please allow 3 business days for your refill to be completed.          Additional Information About Your Visit        [employer] Information     [facility] gives you secure access to your electronic health record. If you see a primary care provider, you can also send messages to your care team and make appointments. If you have questions, please call your primary care clinic.  If you do not have a primary care provider, please call [phone] and they will assist you.        Care EveryWhere ID     This is your " Care EveryWhere ID. This could be used by other organizations to access your [place] medical records  [id number]        Your Vitals Were     Pulse Respirations BMI (Body Mass Index)             88 16 28.11 kg/m2          Blood Pressure from Last 3 Encounters:   07/06/18 136/82   06/15/18 146/86   08/12/16 (!) 146/100    Weight from Last 3 Encounters:   07/06/18 92.1 kg (203 lb)   06/15/18 88 kg (194 lb)   08/12/16 86.6 kg (191 lb)              Today, you had the following     No orders found for display       Primary Care Provider Office Phone # Fax #    Liane Espino [phone] [address]  [facility] [address]        Equal Access to Services     BRYANNA AUSTIN : Hadii ish hammond hadasho Soomaali, waaxda luqadaha, qaybta kaalmada adeegyada, vitor gonzalez hayalphonso pope . So [facility] [phone].    ATENCIÓN: Si habla español, tiene a macias disposición servicios gratuitos de asistencia lingüística. Llame al [phone].    We comply with applicable federal civil rights laws and [place] laws. We do not discriminate on the basis of race, color, national origin, age, disability, sex, sexual orientation, or gender identity.            Thank you!     Thank you for choosing [facility]  for your care. Our goal is always to provide you with excellent care. Hearing back from our patients is one way we can continue to improve our services. Please take a few minutes to complete the written survey that you may receive in the mail after your visit with us. Thank you!             Your Updated Medication List - Protect others around you: Learn how to safely use, store and throw away your medicines at www.disposemymeds.org.          This list is accurate as of 7/6/18  3:44 PM.  Always use your most recent med list.                   Brand Name Dispense Instructions for use Diagnosis    acetaminophen-codeine 300-30 MG per tablet    TYLENOL w/CODEINE No. 3    28 tablet    Take 2  tablets by mouth every 6 hours as needed for pain    Acute left-sided low back pain without sciatica       ASPIRIN PO      Take 325 mg by mouth        cyclobenzaprine 10 MG tablet    FLEXERIL    30 tablet    Take 1 tablet (10 mg) by mouth nightly as needed for muscle spasms    Acute left-sided low back pain without sciatica       LORazepam 0.5 MG tablet    ATIVAN    30 tablet    Take 1 tablet (0.5 mg) by mouth every 6 hours as needed for anxiety    Anxiety       predniSONE 20 MG tablet    DELTASONE    20 tablet    Take 3 tabs (60 mg) by mouth daily x 3 days, 2 tabs (40 mg) daily x 3 days, 1 tab (20 mg) daily x 3 days, then 1/2 tab (10 mg) x 3 days.    Lumbar back pain with radiculopathy affecting left lower extremity, Acute midline thoracic back pain       TYLENOL 325 MG tablet   Generic drug:  acetaminophen      Take 325-650 mg by mouth every 6 hours as needed for mild pain        varenicline 0.5 MG X 11 & 1 MG X 42 tablet    CHANTIX STARTING MONTH ARNOL    53 tablet    Take 0.5 mg tab daily for 3 days, then 0.5 mg tab twice daily for 4 days, then 1 mg twice daily.    Tobacco use disorder

## 2018-11-27 ENCOUNTER — OFFICE VISIT (OUTPATIENT)
Dept: FAMILY MEDICINE | Facility: CLINIC | Age: 33
End: 2018-11-27

## 2018-11-27 VITALS
RESPIRATION RATE: 16 BRPM | BODY MASS INDEX: 27.83 KG/M2 | WEIGHT: 201 LBS | DIASTOLIC BLOOD PRESSURE: 68 MMHG | OXYGEN SATURATION: 97 % | HEART RATE: 104 BPM | SYSTOLIC BLOOD PRESSURE: 134 MMHG

## 2018-11-27 DIAGNOSIS — H60.392 OTHER INFECTIVE ACUTE OTITIS EXTERNA OF LEFT EAR: Primary | ICD-10-CM

## 2018-11-27 PROCEDURE — 99213 OFFICE O/P EST LOW 20 MIN: CPT | Performed by: NURSE PRACTITIONER

## 2018-11-27 RX ORDER — CIPROFLOXACIN AND DEXAMETHASONE 3; 1 MG/ML; MG/ML
4 SUSPENSION/ DROPS AURICULAR (OTIC) 2 TIMES DAILY
Qty: 2.8 ML | Refills: 0 | Status: SHIPPED | OUTPATIENT
Start: 2018-11-27 | End: 2018-12-04

## 2018-11-27 NOTE — PROGRESS NOTES
SUBJECTIVE:   Daniel Greene is a 33 year old male who presents to clinic today for the following health issues:  Left ear pain since Sunday, getting worse. Hearing worse than usual.      Concern - ear pain  Onset: Sunday morning    Description:   Left ear    Intensity: moderate, severe    Progression of Symptoms:  worsening    Accompanying Signs & Symptoms:  none    Previous history of similar problem:   none    Precipitating factors:   Worsened by: none    Alleviating factors:  Improved by: none    Therapies Tried and outcome: none      Problem list and histories reviewed & adjusted, as indicated.  Additional history: as documented    Labs reviewed in EPIC    Reviewed and updated as needed this visit by clinical staff       Reviewed and updated as needed this visit by Provider         ROS:  Constitutional, HEENT, cardiovascular, pulmonary, gi and gu systems are negative, except as otherwise noted.    OBJECTIVE:     /68  Pulse 104  Resp 16  Wt 91.2 kg (201 lb)  SpO2 97%  BMI 27.83 kg/m2  Body mass index is 27.83 kg/(m^2).  GENERAL: healthy, alert and mild distress  EYES: Eyes grossly normal to inspection, PERRL and conjunctivae and sclerae normal  HENT: normal cephalic/atraumatic, right ear: normal: no effusions, no erythema, normal landmarks, left ear: purulent drainage in canal, nose and mouth without ulcers or lesions, oropharynx clear and oral mucous membranes moist  SKIN: no suspicious lesions or rashes  PSYCH: mentation appears normal, affect normal/bright    Diagnostic Test Results:  none     ASSESSMENT/PLAN:         1. Other infective acute otitis externa of left ear    - ciprofloxacin-dexamethasone (CIPRODEX) 0.3-0.1 % otic suspension; Place 4 drops Into the left ear 2 times daily for 7 days  Dispense: 2.8 mL; Refill: 0  advil for pain    FUTURE APPOINTMENTS:       - Follow-up for annual visit or as needed    ROB Cruz [facility]

## 2018-11-27 NOTE — MR AVS SNAPSHOT
After Visit Summary   11/27/2018    Daniel Greene    MRN: [id number]           Patient Information     Date Of Birth          1985        Visit Information        Provider Department      11/27/2018 1:30 PM Samara Salinas APRN CNP [facility]        Today's Diagnoses     Other infective acute otitis externa of left ear    -  1       Follow-ups after your visit        Follow-up notes from your care team     Return if symptoms worsen or fail to improve.      Who to contact     If you have questions or need follow up information about today's clinic visit or your schedule please contact [facility] directly at [phone].  Normal or non-critical lab and imaging results will be communicated to you by [employer] nethart, letter or phone within 4 business days after the clinic has received the results. If you do not hear from us within 7 days, please contact the clinic through Ail[employer] nethart or phone. If you have a critical or abnormal lab result, we will notify you by phone as soon as possible.  Submit refill requests through [employer] or call your pharmacy and they will forward the refill request to us. Please allow 3 business days for your refill to be completed.          Additional Information About Your Visit        MyChart Information     [employer] gives you secure access to your electronic health record. If you see a primary care provider, you can also send messages to your care team and make appointments. If you have questions, please call your primary care clinic.  If you do not have a primary care provider, please call [phone] and they will assist you.        Care EveryWhere ID     This is your Care EveryWhere ID. This could be used by other organizations to access your [place] medical records  [id number]        Your Vitals Were     Pulse Respirations Pulse Oximetry BMI (Body Mass Index)          104 16 97% 27.83 kg/m2         Blood Pressure from Last 3 Encounters:   11/27/18  134/68   07/06/18 136/82   06/15/18 146/86    Weight from Last 3 Encounters:   11/27/18 91.2 kg (201 lb)   07/06/18 92.1 kg (203 lb)   06/15/18 88 kg (194 lb)              Today, you had the following     No orders found for display         Today's Medication Changes          These changes are accurate as of 11/27/18  2:39 PM.  If you have any questions, ask your nurse or doctor.               Start taking these medicines.        Dose/Directions    ciprofloxacin-dexamethasone 0.3-0.1 % otic suspension   Commonly known as:  CIPRODEX   Used for:  Other infective acute otitis externa of left ear   Started by:  Samara Salinas APRN CNP        Dose:  4 drop   Place 4 drops Into the left ear 2 times daily for 7 days   Quantity:  2.8 mL   Refills:  0            Where to get your medicines      These [facility] [address] [place] [address] & [address], [place] [address]     Phone:  [phone]     ciprofloxacin-dexamethasone 0.3-0.1 % otic suspension                Primary Care Provider Office Phone # Fax #    Liane Espino [phone] [address]  [facility] [address]        Equal Access to Services     [facility] HospitalSAS[facility] : Hadii ish ku hadasho Soomaali, waaxda luqadaha, qaybta kaalmada adeegyada, vitor francisco. So [facility] [phone].    ATENCIÓN: Si habla español, tiene a macias disposición servicios gratuitos de asistencia lingüística. Marcin al [phone].    We comply with applicable federal civil rights laws and [place] laws. We do not discriminate on the basis of race, color, national origin, age, disability, sex, sexual orientation, or gender identity.            Thank you!     Thank you for choosing [facility]  for your care. Our goal is always to provide you with excellent care. Hearing back from our patients is one way we can continue to improve our services.  Please take a few minutes to complete the written survey that you may receive in the mail after your visit with us. Thank you!             Your Updated Medication List - Protect others around you: Learn how to safely use, store and throw away your medicines at www.disposemymeds.org.          This list is accurate as of 11/27/18  2:39 PM.  Always use your most recent med list.                   Brand Name Dispense Instructions for use Diagnosis    acetaminophen-codeine 300-30 MG per tablet    TYLENOL w/CODEINE No. 3    28 tablet    Take 2 tablets by mouth every 6 hours as needed for pain    Acute left-sided low back pain without sciatica       ASPIRIN PO      Take 325 mg by mouth        ciprofloxacin-dexamethasone 0.3-0.1 % otic suspension    CIPRODEX    2.8 mL    Place 4 drops Into the left ear 2 times daily for 7 days    Other infective acute otitis externa of left ear       cyclobenzaprine 10 MG tablet    FLEXERIL    30 tablet    Take 1 tablet (10 mg) by mouth nightly as needed for muscle spasms    Acute left-sided low back pain without sciatica       LORazepam 0.5 MG tablet    ATIVAN    30 tablet    Take 1 tablet (0.5 mg) by mouth every 6 hours as needed for anxiety    Anxiety       predniSONE 20 MG tablet    DELTASONE    20 tablet    Take 3 tabs (60 mg) by mouth daily x 3 days, 2 tabs (40 mg) daily x 3 days, 1 tab (20 mg) daily x 3 days, then 1/2 tab (10 mg) x 3 days.    Lumbar back pain with radiculopathy affecting left lower extremity, Acute midline thoracic back pain       TYLENOL 325 MG tablet   Generic drug:  acetaminophen      Take 325-650 mg by mouth every 6 hours as needed for mild pain        varenicline 0.5 MG X 11 & 1 MG X 42 tablet    CHANTIX STARTING MONTH ARNOL    53 tablet    Take 0.5 mg tab daily for 3 days, then 0.5 mg tab twice daily for 4 days, then 1 mg twice daily.    Tobacco use disorder

## 2019-09-30 ENCOUNTER — HEALTH MAINTENANCE LETTER (OUTPATIENT)
Age: 34
End: 2019-09-30

## 2019-11-04 ENCOUNTER — OFFICE VISIT (OUTPATIENT)
Dept: FAMILY MEDICINE | Facility: CLINIC | Age: 34
End: 2019-11-04

## 2019-11-04 VITALS
HEART RATE: 99 BPM | RESPIRATION RATE: 16 BRPM | SYSTOLIC BLOOD PRESSURE: 144 MMHG | OXYGEN SATURATION: 97 % | WEIGHT: 205 LBS | DIASTOLIC BLOOD PRESSURE: 94 MMHG | BODY MASS INDEX: 28.38 KG/M2

## 2019-11-04 DIAGNOSIS — R07.1 CHEST PAIN ON BREATHING: Primary | ICD-10-CM

## 2019-11-04 PROCEDURE — 99213 OFFICE O/P EST LOW 20 MIN: CPT | Mod: 25 | Performed by: NURSE PRACTITIONER

## 2019-11-04 PROCEDURE — 71046 X-RAY EXAM CHEST 2 VIEWS: CPT | Performed by: NURSE PRACTITIONER

## 2019-11-04 PROCEDURE — 93000 ELECTROCARDIOGRAM COMPLETE: CPT | Mod: 59 | Performed by: NURSE PRACTITIONER

## 2019-11-04 RX ORDER — COVID-19 ANTIGEN TEST
440 KIT MISCELLANEOUS 2 TIMES DAILY WITH MEALS
COMMUNITY
End: 2024-03-06

## 2019-11-04 NOTE — PROGRESS NOTES
Problem(s) Oriented visit        SUBJECTIVE:                                                    Daniel Greene is a 34 year old male who presents to clinic today for the following health issues :    Daniel presents with pain in his upper to middle left chest extending around his side and to his upper back. Daniel reports he is made very anxious by doctors and medical clinics. Daniel reports he has had these symptoms before when he strained his chest wall muscles a few yeas ago. Daniel reports he has been taking naproxen or ibuprofen for the pain, which is helping somewhat. His blood pressure was elevated but he thinks this is due to white coat syndrome. No pain, warmth or swelling in his legs. Daniel is a current everyday smoker. He reports he recently moved into a new house, which included lifting and carrying heavy weights.     Problem list, Medication list, Allergies, and Medical/Social/Surgical histories reviewed in EPIC and updated as appropriate.   Additional history: as documented    ROS:  General:  NEGATIVE for fever, chills, change in weight HEENT:  No changes in hearing or vision, no nose bleeds or other nasal problems and Negative for frequent or significant headaches CV:  NEGATIVE for chest pain, palpitations or peripheral edema Resp:  NEGATIVE for significant cough or SOB Musculoskeletal:  POSITIVE  for:, pain in chest wall.     Histories:   Patient Active Problem List   Diagnosis     Health Care Home     Cholesteatoma of ear     Back pain     Hearing loss- left ear     Heartburn     Tobacco use     BMI 28.0-28.9,adult     Past Surgical History:   Procedure Laterality Date     LASER DIODE TYMPANOMASTOIDECTOMY Right 11/4/2015    Procedure: LASER DIODE TYMPANOMASTOIDECTOMY;  Surgeon: Augustina Reilly MD;  Location: [facility] OR     MASTOID SURGERY Right 1995    [facility]     MASTOIDECTOMY Left 3/25/2015    Procedure: MASTOIDECTOMY;  Surgeon: Augustina Reilly MD;  [facility]       Social History     Tobacco  Use     Smoking status: Current Some Day Smoker     Smokeless tobacco: Never Used   Substance Use Topics     Alcohol use: Yes     Alcohol/week: 0.0 standard drinks     No family history on file.        OBJECTIVE:                                                    BP (!) 144/94   Pulse 99   Resp 16   Wt 93 kg (205 lb)   SpO2 97%   BMI 28.38 kg/m    Body mass index is 28.38 kg/m .   GENERAL: healthy, alert and no distress  EYES: Eyes grossly normal to inspection, PERRL and conjunctivae and sclerae normal  RESP: lungs clear to auscultation - no rales, rhonchi or wheezes  CV: regular rate and rhythm, normal S1 S2, no S3 or S4, no murmur, click or rub, no peripheral edema and peripheral pulses strong  ABDOMEN: soft, nontender, no hepatosplenomegaly, no masses and bowel sounds normal  MS: tenderness to palpation anterior, lateral and posterior left ribs.   NEURO: Normal strength and tone, mentation intact and speech normal     ASSESSMENT/PLAN:                                                        Diagnoses and all orders for this visit:    Chest pain on breathing  -     EKG 12-lead complete w/read - Clinics  -     X-ray Chest 2 vws*    The ECG is normal and your lungs sound clear, I will send the chest X-ray to radiology for interpretation and I will call if they see anything concerning. Please rest and continue to use NSAIDs to help with the pain. Give your strained muscles a change to heal. Please call if you symptoms do not improve in the next week or two.     ASSESSMENT/PLAN:       The following health maintenance items are reviewed in Epic and correct as of today:  Health Maintenance   Topic Date Due     PREVENTIVE CARE VISIT  1985     HIV SCREENING  06/03/1999     PNEUMOCOCCAL IMMUNIZATION 19-64 MEDIUM RISK (1 of 1 - PPSV23) 06/03/2003     PHQ-2  01/01/2019     INFLUENZA VACCINE (1) 09/01/2019     LIPID  06/15/2023     DTAP/TDAP/TD IMMUNIZATION (7 - Td) 06/15/2028     IPV IMMUNIZATION  Completed      MENINGITIS IMMUNIZATION  Aged Out       Rufina Yeager NP  [facility]  Family Practice  [facility]  [phone]    For any issues my office # is [phone]

## 2019-11-04 NOTE — LETTER
November 4, 2019      aDniel Greene  [address]  [facility] [address]        To Whom It May Concern:    Daniel Greene  was seen on 11/04/2019.  Please excuse him for three days due to injury.        Sincerely,        Rufina Yeager NP

## 2019-11-04 NOTE — PATIENT INSTRUCTIONS
Chest Wall Pain: Costochondritis    The chest pain that you have had today is caused by costochondritis. This condition is caused by an inflammation of the cartilage joining your ribs to your breastbone. It is not caused by heart or lung problems. Your healthcare team has made sure that the chest pain you feel is not from a life threatening cause of chest pain such as heart attack, collapsed lung, blood clot in the lung, tear in the aorta, or esophageal rupture. The inflammation may have been brought on by a blow to the chest, lifting heavy objects, intense exercise, or an illness that made you cough and sneeze a lot. It often occurs during times of emotional stress. It can be painful, but it is not dangerous. It usually goes away in 1 to 2 weeks. But it may happen again. Rarely, a more serious condition may cause symptoms similar to costochondritis. That s why it s important to watch for the warning signs listed below.  Home care  Follow these guidelines when caring for yourself at home:    If you feel that emotional stress is a cause of your condition, try to figure out the sources of that stress. It may not be obvious. Learn ways to deal with the stress in your life. This can include regular exercise, muscle relaxation, meditation, or simply taking time out for yourself.    You may use acetaminophen, ibuprofen, or naproxen to control pain, unless another pain medicine was prescribed. If you have liver or kidney disease or ever had a stomach ulcer, talk with your healthcare provider before using these medicines.    You can also help ease pain by using a hot, wet compress or heating pad. Use this with or without a medicated skin cream that helps relieves pain.    Do stretching exercise as advised by your provider.    Take any prescribed medicines as directed.  Follow-up care  Follow up with your healthcare provider, or as advised, if you do not start to get better in the next 2 days.  When to seek medical  advice  Call your healthcare provider right away if any of these occur:    A change in the type of pain. Call if it feels different, becomes more serious, lasts longer, or spreads into your shoulder, arm, neck, jaw, or back.    Shortness of breath or pain gets worse when you breathe    Weakness, dizziness, or fainting    Cough with dark-colored sputum (phlegm) or blood    Abdominal pain    Dark red or black stools    Fever of 100.4 F (38 C) or higher, or as directed by your healthcare provider  Date Last Reviewed: 12/1/2016 2000-2018 [employer]. [address] [place] [address]. All rights reserved. This information is not intended as a substitute for professional medical care. Always follow your healthcare professional's instructions.           Uncertain Causes of Chest Pain    Chest pain can happen for a number of reasons. Sometimes the cause can't be determined. If your condition does not seem serious, and your pain does not appear to be coming from your heart, your healthcare provider may recommend watching it closely. Sometimes the signs of a serious problem take more time to appear. Many problems not related to your heart can cause chest pain. These include:    Musculoskeletal. Costochondritis is an inflammation of the tissues around the ribs that can occur from trauma or overuse injuries, or a strain of the muscles of the chest wall    Respiratory. Pneumonia, collapsed lung (pneumothorax), or inflammation of the lining of the chest and lungs (pleurisy)    Gastrointestinal. Esophageal reflux, heartburn, ulcers, or gallbladder disease    Anxiety and panic disorders    Nerve compression and inflammation    Rare miscellaneous problems such as aortic aneurysm (a swelling of the large artery coming out of the heart) or pulmonary embolism (a blood clot in the lungs)  Home care  After your visit, follow these recommendations:    Rest today and avoid strenuous activity.    Take any  prescribed medicine as directed.    Be aware of any recurrent chest pain and notice any changes  Follow-up care  Follow up with your healthcare provider if you do not start to feel better within 24 hours, or as advised.  Call 911  Call 911 if any of these occur:    A change in the type of pain: if it feels different, becomes more severe, lasts longer, or begins to spread into your shoulder, arm, neck, jaw or back    Shortness of breath or increased pain with breathing    Weakness, dizziness, or fainting    Rapid heart beat    Crushing sensation in your chest  When to seek medical advice  Call your healthcare provider right away if any of the following occur:    Cough with dark colored sputum (phlegm) or blood    Fever of 100.4 F (38 C) or higher, or as directed by your healthcare provider    Swelling, pain or redness in one leg  Date Last Reviewed: 5/1/2018 2000-2018 [employer]. [address] [place] [address], [place] [address]. All rights reserved. This information is not intended as a substitute for professional medical care. Always follow your healthcare professional's instructions.

## 2021-01-15 ENCOUNTER — HEALTH MAINTENANCE LETTER (OUTPATIENT)
Age: 36
End: 2021-01-15

## 2021-10-24 ENCOUNTER — HEALTH MAINTENANCE LETTER (OUTPATIENT)
Age: 36
End: 2021-10-24

## 2022-02-13 ENCOUNTER — HEALTH MAINTENANCE LETTER (OUTPATIENT)
Age: 37
End: 2022-02-13

## 2022-08-30 ENCOUNTER — OFFICE VISIT (OUTPATIENT)
Dept: FAMILY MEDICINE | Facility: CLINIC | Age: 37
End: 2022-08-30

## 2022-08-30 ENCOUNTER — MYC MEDICAL ADVICE (OUTPATIENT)
Dept: FAMILY MEDICINE | Facility: CLINIC | Age: 37
End: 2022-08-30

## 2022-08-30 VITALS
HEART RATE: 90 BPM | RESPIRATION RATE: 16 BRPM | DIASTOLIC BLOOD PRESSURE: 90 MMHG | WEIGHT: 206.2 LBS | TEMPERATURE: 98.4 F | OXYGEN SATURATION: 97 % | SYSTOLIC BLOOD PRESSURE: 138 MMHG | BODY MASS INDEX: 28.55 KG/M2

## 2022-08-30 DIAGNOSIS — Z98.890 HISTORY OF TYMPANOMASTOIDECTOMY: ICD-10-CM

## 2022-08-30 DIAGNOSIS — Z86.69 H/O CHOLESTEATOMA: ICD-10-CM

## 2022-08-30 DIAGNOSIS — H92.03 EAR PAIN, BILATERAL: Primary | ICD-10-CM

## 2022-08-30 PROBLEM — B96.89: Status: ACTIVE | Noted: 2022-08-30

## 2022-08-30 PROBLEM — H66.93: Status: ACTIVE | Noted: 2022-08-30

## 2022-08-30 PROCEDURE — 99214 OFFICE O/P EST MOD 30 MIN: CPT | Performed by: FAMILY MEDICINE

## 2022-08-30 NOTE — PROGRESS NOTES
"SUBJECTIVE:      Daniel Greene, is a 37 year old male with PMHx of cholesteatoma of bilateral ears (s/p bilateral  tympanomastoidectomy 2015:  wears a bone conduction hearing aid with pocket talker. ), presenting for the below:     1.1 week Hx of sensation of bilateral ear pressure (above baseline of bilateral sensation of this and dizziness  following tympanomastoidectomy 2016.). Accompanied by sensation of \"lump in the throat\" and anterior throat being swollen. Denies caroline sore throat. No dysphagia or odynophagia.     Recent COVID 19 testing negative.     No longer follows with ENT due to poor therapeutic fit with prior provider.     OBJECTIVE:  Vitals:    08/30/22 0945   BP: (!) 138/90   Pulse: 90   Resp: 16   Temp: 98.4  F (36.9  C)   SpO2: 97%   Weight: 93.5 kg (206 lb 3.2 oz)    Body mass index is 28.55 kg/m .    General: no acute distress, cooperative with exam.  Ears:  bilateral mastoid cavities with some patches of erythema. No exudate present. No tenderness over area of prior mastoid processes / posterior auricular area .  Throat: moist mucous membranes, no tonsillar exudate or hypertrophy, posterior oral pharynx non-erythematous without lesions.  Neck: supple, no thyroid nodules or enlargement.    ASSESSMENT / PLAN:        Ear pain, bilateral  H/O cholesteatoma  History of tympanomastoidectomy  Unable to palpate on clinic exam today area of swelling patient is concerned by in anterior neck. No cervical lymph nodes palpated or masses. Patches of erythematous skin within epithelialized bilateral mastoid cavities. Patients concern is for having a developing / ensuing bilateral mastoid cavity infection.   -will start Abx coverage based on patient history and level of concern  -importance of having regular follow up with ENT in light of s/p bilateral  tympanomastoidectomy 2015 discussed  -will refer to Dr. Jolie Keyes at ENT speciality care in [place].     -     Adult Audiology  Referral; Future  -     " amoxicillin-clavulanate (AUGMENTIN) 875-125 MG tablet; Take 1 tablet by mouth 2 times daily for 10 days

## 2022-10-16 ENCOUNTER — HEALTH MAINTENANCE LETTER (OUTPATIENT)
Age: 37
End: 2022-10-16

## 2022-10-19 ENCOUNTER — TRANSFERRED RECORDS (OUTPATIENT)
Dept: FAMILY MEDICINE | Facility: CLINIC | Age: 37
End: 2022-10-19

## 2023-03-26 ENCOUNTER — HEALTH MAINTENANCE LETTER (OUTPATIENT)
Age: 38
End: 2023-03-26

## 2023-05-22 NOTE — TELEPHONE ENCOUNTER
5/16/2018      [facility] Other- declined on-boarding.            Outreach ,  Nico Coleman       Photo Preface (Leave Blank If You Do Not Want): Photographs were obtained today Detail Level: Zone

## 2024-02-29 ENCOUNTER — APPOINTMENT (OUTPATIENT)
Dept: CT IMAGING | Facility: CLINIC | Age: 39
End: 2024-02-29
Attending: EMERGENCY MEDICINE
Payer: COMMERCIAL

## 2024-02-29 ENCOUNTER — HOSPITAL ENCOUNTER (EMERGENCY)
Facility: CLINIC | Age: 39
Discharge: HOME | End: 2024-02-29
Attending: EMERGENCY MEDICINE | Admitting: EMERGENCY MEDICINE
Payer: COMMERCIAL

## 2024-02-29 VITALS
OXYGEN SATURATION: 99 % | TEMPERATURE: 97.5 F | DIASTOLIC BLOOD PRESSURE: 81 MMHG | RESPIRATION RATE: 18 BRPM | WEIGHT: 200 LBS | BODY MASS INDEX: 27.69 KG/M2 | SYSTOLIC BLOOD PRESSURE: 122 MMHG | HEART RATE: 81 BPM

## 2024-02-29 DIAGNOSIS — R79.82 ELEVATED C-REACTIVE PROTEIN (CRP): ICD-10-CM

## 2024-02-29 DIAGNOSIS — M54.2 NECK PAIN ON RIGHT SIDE: ICD-10-CM

## 2024-02-29 LAB
ANION GAP SERPL CALCULATED.3IONS-SCNC: 14 MMOL/L (ref 7–15)
BASOPHILS # BLD AUTO: 0.1 10E3/UL (ref 0–0.2)
BASOPHILS NFR BLD AUTO: 1 %
BUN SERPL-MCNC: 13.5 MG/DL (ref 6–20)
CALCIUM SERPL-MCNC: 9.6 MG/DL (ref 8.6–10)
CHLORIDE SERPL-SCNC: 103 MMOL/L (ref 98–107)
CREAT SERPL-MCNC: 0.68 MG/DL (ref 0.67–1.17)
CRP SERPL-MCNC: 8.78 MG/L
DEPRECATED HCO3 PLAS-SCNC: 22 MMOL/L (ref 22–29)
EGFRCR SERPLBLD CKD-EPI 2021: >90 ML/MIN/1.73M2
EOSINOPHIL # BLD AUTO: 0.2 10E3/UL (ref 0–0.7)
EOSINOPHIL NFR BLD AUTO: 2 %
ERYTHROCYTE [DISTWIDTH] IN BLOOD BY AUTOMATED COUNT: 11.9 % (ref 10–15)
GLUCOSE SERPL-MCNC: 102 MG/DL (ref 70–99)
GROUP A STREP BY PCR: NOT DETECTED
HCT VFR BLD AUTO: 43.8 % (ref 40–53)
HGB BLD-MCNC: 14.5 G/DL (ref 13.3–17.7)
IMM GRANULOCYTES # BLD: 0.1 10E3/UL
IMM GRANULOCYTES NFR BLD: 1 %
LYMPHOCYTES # BLD AUTO: 2 10E3/UL (ref 0.8–5.3)
LYMPHOCYTES NFR BLD AUTO: 20 %
MCH RBC QN AUTO: 30.6 PG (ref 26.5–33)
MCHC RBC AUTO-ENTMCNC: 33.1 G/DL (ref 31.5–36.5)
MCV RBC AUTO: 92 FL (ref 78–100)
MONOCYTES # BLD AUTO: 0.7 10E3/UL (ref 0–1.3)
MONOCYTES NFR BLD AUTO: 7 %
NEUTROPHILS # BLD AUTO: 6.9 10E3/UL (ref 1.6–8.3)
NEUTROPHILS NFR BLD AUTO: 69 %
NRBC # BLD AUTO: 0 10E3/UL
NRBC BLD AUTO-RTO: 0 /100
PLATELET # BLD AUTO: 373 10E3/UL (ref 150–450)
POTASSIUM SERPL-SCNC: 4.7 MMOL/L (ref 3.4–5.3)
RBC # BLD AUTO: 4.74 10E6/UL (ref 4.4–5.9)
SODIUM SERPL-SCNC: 139 MMOL/L (ref 135–145)
WBC # BLD AUTO: 10 10E3/UL (ref 4–11)

## 2024-02-29 PROCEDURE — 250N000011 HC RX IP 250 OP 636: Performed by: EMERGENCY MEDICINE

## 2024-02-29 PROCEDURE — 80048 BASIC METABOLIC PNL TOTAL CA: CPT | Performed by: EMERGENCY MEDICINE

## 2024-02-29 PROCEDURE — 96374 THER/PROPH/DIAG INJ IV PUSH: CPT | Mod: 59

## 2024-02-29 PROCEDURE — 250N000009 HC RX 250: Performed by: EMERGENCY MEDICINE

## 2024-02-29 PROCEDURE — 36415 COLL VENOUS BLD VENIPUNCTURE: CPT | Performed by: EMERGENCY MEDICINE

## 2024-02-29 PROCEDURE — 99285 EMERGENCY DEPT VISIT HI MDM: CPT | Mod: 25

## 2024-02-29 PROCEDURE — 87651 STREP A DNA AMP PROBE: CPT | Performed by: EMERGENCY MEDICINE

## 2024-02-29 PROCEDURE — 96376 TX/PRO/DX INJ SAME DRUG ADON: CPT

## 2024-02-29 PROCEDURE — 70491 CT SOFT TISSUE NECK W/DYE: CPT

## 2024-02-29 PROCEDURE — 250N000013 HC RX MED GY IP 250 OP 250 PS 637: Performed by: EMERGENCY MEDICINE

## 2024-02-29 PROCEDURE — 86140 C-REACTIVE PROTEIN: CPT | Performed by: EMERGENCY MEDICINE

## 2024-02-29 PROCEDURE — 96375 TX/PRO/DX INJ NEW DRUG ADDON: CPT | Mod: 59

## 2024-02-29 PROCEDURE — 85025 COMPLETE CBC W/AUTO DIFF WBC: CPT | Performed by: EMERGENCY MEDICINE

## 2024-02-29 RX ORDER — HYDROMORPHONE HYDROCHLORIDE 1 MG/ML
0.5 INJECTION, SOLUTION INTRAMUSCULAR; INTRAVENOUS; SUBCUTANEOUS ONCE
Status: COMPLETED | OUTPATIENT
Start: 2024-02-29 | End: 2024-02-29

## 2024-02-29 RX ORDER — DEXAMETHASONE 4 MG/1
4 TABLET ORAL 3 TIMES DAILY
Qty: 15 TABLET | Refills: 0 | Status: SHIPPED | OUTPATIENT
Start: 2024-02-29 | End: 2024-04-24

## 2024-02-29 RX ORDER — DEXAMETHASONE SODIUM PHOSPHATE 4 MG/ML
10 INJECTION, SOLUTION INTRA-ARTICULAR; INTRALESIONAL; INTRAMUSCULAR; INTRAVENOUS; SOFT TISSUE ONCE
Status: COMPLETED | OUTPATIENT
Start: 2024-02-29 | End: 2024-02-29

## 2024-02-29 RX ORDER — KETOROLAC TROMETHAMINE 15 MG/ML
15 INJECTION, SOLUTION INTRAMUSCULAR; INTRAVENOUS ONCE
Status: COMPLETED | OUTPATIENT
Start: 2024-02-29 | End: 2024-02-29

## 2024-02-29 RX ORDER — IOPAMIDOL 755 MG/ML
90 INJECTION, SOLUTION INTRAVASCULAR ONCE
Status: COMPLETED | OUTPATIENT
Start: 2024-02-29 | End: 2024-02-29

## 2024-02-29 RX ORDER — HYDROCODONE BITARTRATE AND ACETAMINOPHEN 5; 325 MG/1; MG/1
1 TABLET ORAL ONCE
Status: COMPLETED | OUTPATIENT
Start: 2024-02-29 | End: 2024-02-29

## 2024-02-29 RX ADMIN — SODIUM CHLORIDE 60 ML: 9 INJECTION, SOLUTION INTRAVENOUS at 10:55

## 2024-02-29 RX ADMIN — DEXAMETHASONE SODIUM PHOSPHATE 10 MG: 4 INJECTION, SOLUTION INTRAMUSCULAR; INTRAVENOUS at 13:02

## 2024-02-29 RX ADMIN — KETOROLAC TROMETHAMINE 15 MG: 15 INJECTION, SOLUTION INTRAMUSCULAR; INTRAVENOUS at 09:53

## 2024-02-29 RX ADMIN — IOPAMIDOL 90 ML: 755 INJECTION, SOLUTION INTRAVENOUS at 10:54

## 2024-02-29 RX ADMIN — HYDROCODONE BITARTRATE AND ACETAMINOPHEN 1 TABLET: 5; 325 TABLET ORAL at 13:02

## 2024-02-29 RX ADMIN — HYDROMORPHONE HYDROCHLORIDE 0.5 MG: 1 INJECTION, SOLUTION INTRAMUSCULAR; INTRAVENOUS; SUBCUTANEOUS at 09:55

## 2024-02-29 RX ADMIN — HYDROMORPHONE HYDROCHLORIDE 0.5 MG: 1 INJECTION, SOLUTION INTRAMUSCULAR; INTRAVENOUS; SUBCUTANEOUS at 13:05

## 2024-02-29 ASSESSMENT — ACTIVITIES OF DAILY LIVING (ADL)
ADLS_ACUITY_SCORE: 35
ADLS_ACUITY_SCORE: 33
ADLS_ACUITY_SCORE: 35

## 2024-02-29 ASSESSMENT — COLUMBIA-SUICIDE SEVERITY RATING SCALE - C-SSRS
2. HAVE YOU ACTUALLY HAD ANY THOUGHTS OF KILLING YOURSELF IN THE PAST MONTH?: NO
1. IN THE PAST MONTH, HAVE YOU WISHED YOU WERE DEAD OR WISHED YOU COULD GO TO SLEEP AND NOT WAKE UP?: NO
6. HAVE YOU EVER DONE ANYTHING, STARTED TO DO ANYTHING, OR PREPARED TO DO ANYTHING TO END YOUR LIFE?: NO

## 2024-02-29 NOTE — ED PROVIDER NOTES
History     Chief Complaint:  Headache, Neck Pain, and Otalgia       The history is provided by the patient.      Daniel Greene is a 38 year old male with history of chronic otitis s/p left tympanomastoidectomy and bilateral mastoidectomy and left-sided hearing loss with bone conduction device in place who presents to the ED with 3 days of constant right head, neck, ear, and jaw pain. He states it initially presented similar to an ear infection, which he gets chronically, but now the pain radiates down his right neck and jaw and is not resolving. He denies new hearing changes, headache, dysphagia, pharyngitis, fever, or chills. He endorses smoking cigarettes daily and 3-4 drinks/week. Daniel notes he had a recent CAT scan with [facility] that was unremarkable, performed due to feeling a lump in his throat when swallowing, which is now resolved.    Independent Historian:   None    Review of External Notes:      Allergies:  No Known Allergies     Listed Medications:    The patient is not currently taking any prescribed medications.     Past Medical and Surgical History:    Hearing loss, left ear  Tobacco use disorder  Cholesteatoma  Anxiety  Heartburn  Bilateral bacterial ear infection    Tympanomastoidectomy, right  Mastoidectomy, bilateral    Family History:    family history is not on file.    Social History:   reports that he has been smoking. He has never used smokeless tobacco. He reports current alcohol use. He reports that he does not use drugs.      Physical Exam   Patient Vitals for the past 24 hrs:   BP Temp Temp src Pulse Resp SpO2 Weight   02/29/24 1129 122/81 -- -- 81 -- 97 % --   02/29/24 1015 -- -- -- -- -- 96 % --   02/29/24 1000 -- -- -- -- -- 97 % --   02/29/24 0831 (!) 157/97 97.5  F (36.4  C) Temporal 89 18 99 % 90.7 kg (200 lb)      General: Resting uncomfortably on the gurney  Head:  The scalp, face, and head appear normal  Eyes:  The pupils are equal, round, and reactive to light    There is no  nystagmus    Extraocular muscles are intact    Conjunctivae and sclerae are normal  ENT:  Right ear: There is a defect in the tympanic membrane, which is chronic. No acute infection is noted. Mastoid is nontender.   Left ear: There is a contusion likely from a Q-tip to the posterior tympanic membrane. No active bleeding. No signs of infection. The mastoid is nontender.     The nose is normal  Pinnae are normal    The oropharynx is normal    Uvula is in the midline  Neck:  There is tenderness involving the right SCM and carotid sheath region.  No obvious abnormality is palpated. No lymphadenopathy is noted.   Normal range of motion    Trachea is in the midline    No mass is detected  CV:  Regular rate and underlying rhythm     Normal S1/S2, no S3/S4    No pathological murmur detected  Resp:  Lungs are clear    There is no tachypnea    Non-labored    No rales    No wheezing   MS:  Normal muscular tone    Symmetric motor strength    No major joint effusions    No asymmetric leg swelling, no calf tenderness  Skin:  No rash or acute skin lesions noted  Neuro: Speech is normal and fluent  Psych:  Awake. Alert.      Normal affect.  Appropriate interactions.  Lymph: No anterior cervical lymphadenopathy noted          Emergency Department Course     Imaging:  Soft tissue neck CT w contrast   Final Result   IMPRESSION:     1. Minimal soft tissue thickening surrounding the right distal common   carotid artery, carotid bulb, and proximal internal carotid artery.   Caliber of the arteries at this level appears normal. These findings   can be seen in the setting of carotidynia given symptoms.          ASAD CATHERINE MD            SYSTEM ID:  [id number]           Reports in this section have been read by the radiologist.    Laboratory:  Labs Ordered and Resulted from Time of ED Arrival to Time of ED Departure   BASIC METABOLIC PANEL - Abnormal       Result Value    Sodium 139      Potassium 4.7      Chloride 103      Carbon  Dioxide (CO2) 22      Anion Gap 14      Urea Nitrogen 13.5      Creatinine 0.68      GFR Estimate >90      Calcium 9.6      Glucose 102 (*)    CRP INFLAMMATION - Abnormal    CRP Inflammation 8.78 (*)    GROUP A STREPTOCOCCUS PCR THROAT SWAB - Normal    Group A strep by PCR Not Detected     CBC WITH PLATELETS AND DIFFERENTIAL    WBC Count 10.0      RBC Count 4.74      Hemoglobin 14.5      Hematocrit 43.8      MCV 92      MCH 30.6      MCHC 33.1      RDW 11.9      Platelet Count 373      % Neutrophils 69      % Lymphocytes 20      % Monocytes 7      % Eosinophils 2      % Basophils 1      % Immature Granulocytes 1      NRBCs per 100 WBC 0      Absolute Neutrophils 6.9      Absolute Lymphocytes 2.0      Absolute Monocytes 0.7      Absolute Eosinophils 0.2      Absolute Basophils 0.1      Absolute Immature Granulocytes 0.1      Absolute NRBCs 0.0            Emergency Department Course & Assessments:             Interventions/ED Medications:  Medications   HYDROmorphone (PF) (DILAUDID) injection 0.5 mg (has no administration in time range)   dexAMETHasone (DECADRON) injection 10 mg (has no administration in time range)   HYDROcodone-acetaminophen (NORCO) 5-325 MG per tablet 1 tablet (has no administration in time range)   ketorolac (TORADOL) injection 15 mg (15 mg Intravenous $Given 2/29/24 0953)   HYDROmorphone (PF) (DILAUDID) injection 0.5 mg (0.5 mg Intravenous $Given 2/29/24 0955)   iopamidol (ISOVUE-370) solution 90 mL (90 mLs Intravenous $Given 2/29/24 1054)   Saline (60 mLs Intravenous $Given 2/29/24 1055)        Independent Interpretation of Radiology Studies by Dr. Guzman      Assessments/Consultations/Discussion of Management:  ED Course as of 02/29/24 1250   Thu Feb 29, 2024   0910 I obtained history and examined the patient as noted above.    1245 I rechecked the patient and explained findings. Patient discharged home with instructions regarding supportive care, medications, and reasons to return. The  importance of close follow-up was reviewed.          Disposition:  The patient was discharged to home.    Impression & Plan        Medical Decision Making:  This patient presents to the emergency department with some tenderness involving the right neck in the area of the sternocleidomastoid and the carotid sheath area.  He has longstanding and chronic issues with both ears and mastoid areas.  He has no pain in any of the dentition.  He has some point tenderness involving the area of the inferior carotid sheath area with referred pain that goes up and down in the area.  His CRP was noted to be mildly elevated.  His white count is normal.  The patient underwent CT scan of the neck to make sure that there was no carotid dissection or evidence of LaMere syndrome or mass or other inflammatory process.  A small amount of soft tissue swelling is noted by the radiologist.  This is in the area where the patient is having pain.  The exact etiology is not clear.  Given the mild elevation in the CRP the point tenderness in this location and the mild abnormality on CT I will start the patient on an empiric course of potent corticosteroids.  A mild vasculitis could be in the differential diagnosis.  There is no evidence of dissection or contained abscess or reactive cervical lymphadenopathy.  The patient will hopefully improve with these modalities and can follow-up with primary care.  If there is ongoing pain in this area consultation with vascular surgery and or rheumatology may be considered.        Diagnosis:    ICD-10-CM    1. Neck pain on right side  M54.2       2. Elevated C-reactive protein (CRP)  R79.82              Discharge Medications (if applicable):  New Prescriptions    DEXAMETHASONE (DECADRON) 4 MG TABLET    Take 1 tablet (4 mg) by mouth 3 times daily for 5 days          Scribe Disclosure:  Marjan CHOUDHARY Hailie, am serving as a scribe at 12:45 PM on 2/29/2024 to document services personally performed by Rock  Sai BLANCO MD based on my observations and the provider's statements to me.   2/29/2024   Sai Guzman MD Rock, Michael P, MD  02/29/24 5644

## 2024-02-29 NOTE — DISCHARGE INSTRUCTIONS
Take your dexamethasone as directed.  This has been sent to your pharmacy.  If you are having ongoing pain after you finish the burst of corticosteroid, please make an appointment to see your doctor.  You may eventually need a consultation with rheumatology and or vascular surgery if things worsen.

## 2024-03-06 ENCOUNTER — TELEPHONE (OUTPATIENT)
Dept: OTHER | Facility: CLINIC | Age: 39
End: 2024-03-06
Payer: COMMERCIAL

## 2024-03-06 ENCOUNTER — OFFICE VISIT (OUTPATIENT)
Dept: FAMILY MEDICINE | Facility: CLINIC | Age: 39
End: 2024-03-06

## 2024-03-06 VITALS
HEART RATE: 74 BPM | DIASTOLIC BLOOD PRESSURE: 89 MMHG | OXYGEN SATURATION: 97 % | BODY MASS INDEX: 30.06 KG/M2 | WEIGHT: 210 LBS | SYSTOLIC BLOOD PRESSURE: 138 MMHG | HEIGHT: 70 IN

## 2024-03-06 DIAGNOSIS — G90.01 CAROTIDYNIA: Primary | ICD-10-CM

## 2024-03-06 LAB
CRP SERPL-MCNC: <3 MG/L
ERYTHROCYTE [SEDIMENTATION RATE] IN BLOOD: 1 MM/HR (ref 0–15)

## 2024-03-06 PROCEDURE — 86140 C-REACTIVE PROTEIN: CPT | Performed by: FAMILY MEDICINE

## 2024-03-06 PROCEDURE — 86140 C-REACTIVE PROTEIN: CPT | Mod: 90 | Performed by: FAMILY MEDICINE

## 2024-03-06 PROCEDURE — 99214 OFFICE O/P EST MOD 30 MIN: CPT | Performed by: FAMILY MEDICINE

## 2024-03-06 PROCEDURE — 36415 COLL VENOUS BLD VENIPUNCTURE: CPT | Performed by: FAMILY MEDICINE

## 2024-03-06 PROCEDURE — 85651 RBC SED RATE NONAUTOMATED: CPT | Performed by: FAMILY MEDICINE

## 2024-03-06 RX ORDER — METHYLPREDNISOLONE 4 MG
TABLET, DOSE PACK ORAL
Qty: 21 TABLET | Refills: 0 | Status: SHIPPED | OUTPATIENT
Start: 2024-03-06 | End: 2024-04-24

## 2024-03-06 NOTE — PROGRESS NOTES
"SUBJECTIVE:    Daniel Greene, is a 38 year old male with chronic otitis s/p left tympanomastoidectomy and bilateral mastoidectomy and left-sided hearing loss with bone conduction device in place presenting for the below:     1. ER presentation follow up : seen 2/29/2024 with tenderness involving the right neck in the area of the sternocleidomastoid and the carotid sheath area.  After reassuring lab work and imaging, aetiology of symptoms were felt unclear, but due to mild elevation in the CRP the point tenderness in this location and the mild abnormality on CT (small amount of soft tissue swelling noted by radiologist) was issued empiric course of potent corticosteroids (4 mg dexamethasone PO TID for 5 days. Completed this yesterday morning.     Feels symptoms are now worsening again. Notes stiffness, pain  heat and tenderness to right neck area (again in sternocleidomastoid and the carotid sheath area). Associated jaw pain, fevers / flushed, dental pain.     Denies any dental issued / reports good dentition. No change in vision. No upper extremity pain.        OBJECTIVE:  Vitals:    03/06/24 1430 03/06/24 1435   BP: (!) 143/97 138/89   Pulse: 74    SpO2: 97%    Weight: 95.3 kg (210 lb)    Height: 1.765 m (5' 9.5\")     Body mass index is 30.57 kg/m .    General: no acute distress, cooperative with exam.  Throat: moist mucous membranes, no tonsillar exudate or hypertrophy, posterior oral pharynx non-erythematous without lesions.  Neck: supple, no thyroid nodules or enlargement. Tenderness indicated by patient in area consistent with left carotid artery     CT SCAN OF THE NECK WITH CONTRAST 2/29/2024   Minimal soft tissue thickening surrounding the right distal common  carotid artery, carotid bulb, and proximal internal carotid artery.  Caliber of the arteries at this level appears normal. These findings  can be seen in the setting of carotidynia given symptoms.     ASSESSMENT / PLAN:      Carotidynia  -re emergence " of symptoms since stopping dexamethasone high potency steroid course yesterday (completed 5 days).   -recheck CRP / ESR  -start medrol dose pack  -urgent referral to vascular medicine : will be seen tomorrow.     Appointments in Next Year      Mar 07, 2024  1:00 PM  (Arrive by 12:45 PM)  New Vascular Patient with Rozina Mccullough MD  [facility] [place] ([facility] ) [phone]     -     CRP inflammation; Future  -     Sed Zhang Lynch (SURYA)  -     Vascular Medicine Referral; Future  -     methylPREDNISolone (MEDROL DOSEPAK) 4 MG tablet therapy pack; Follow Package Directions  -     VENOUS COLLECTION          Follow up:  No follow-ups on file.

## 2024-03-06 NOTE — TELEPHONE ENCOUNTER
Referral received via [employer] on 03/06/24 .    Pt referred to [facility] by Staci Sears for carotdynia      Future Appointments   Date Time Provider Department Center   3/7/2024  1:00 PM Rozina Mccullough MD [facility]   4/24/2024 10:15 AM Staci Sears MD [facility]

## 2024-03-06 NOTE — TELEPHONE ENCOUNTER
[facility]ASC[facility]    Who is the name of the provider?:  [facility] NURSE   What is the location you see this provider at/preferred location?: [place]  Person calling / Facility: Yvonne [facility]  Phone number:  [phone]  Nurse call back needed:  Yes     Reason for call:  Yvonne would like a call back to discuss Daniel's case - there is a referral in active request from Staci Sears -    Pharmacy location:     [facility] [address] [place] [address] [id number] [address]  Outside Imaging: n/a   Can we leave a detailed message on this number?  YES     3/6/2024, 3:17 PM

## 2024-03-07 ENCOUNTER — OFFICE VISIT (OUTPATIENT)
Dept: OTHER | Facility: CLINIC | Age: 39
End: 2024-03-07
Attending: INTERNAL MEDICINE
Payer: COMMERCIAL

## 2024-03-07 ENCOUNTER — HOSPITAL ENCOUNTER (OUTPATIENT)
Dept: ULTRASOUND IMAGING | Facility: CLINIC | Age: 39
Discharge: HOME | End: 2024-03-07
Attending: INTERNAL MEDICINE
Payer: COMMERCIAL

## 2024-03-07 VITALS
DIASTOLIC BLOOD PRESSURE: 95 MMHG | WEIGHT: 205 LBS | OXYGEN SATURATION: 97 % | HEART RATE: 76 BPM | BODY MASS INDEX: 29.84 KG/M2 | SYSTOLIC BLOOD PRESSURE: 155 MMHG

## 2024-03-07 DIAGNOSIS — G90.01 CAROTIDYNIA: Primary | ICD-10-CM

## 2024-03-07 DIAGNOSIS — I10 BENIGN ESSENTIAL HYPERTENSION: ICD-10-CM

## 2024-03-07 DIAGNOSIS — F17.218 CIGARETTE NICOTINE DEPENDENCE WITH OTHER NICOTINE-INDUCED DISORDER: ICD-10-CM

## 2024-03-07 DIAGNOSIS — Z98.890 HISTORY OF TYMPANOMASTOIDECTOMY: ICD-10-CM

## 2024-03-07 DIAGNOSIS — Z86.69 H/O CHOLESTEATOMA: ICD-10-CM

## 2024-03-07 DIAGNOSIS — E78.5 HYPERLIPIDEMIA LDL GOAL <70: ICD-10-CM

## 2024-03-07 DIAGNOSIS — G90.01 CAROTIDYNIA: ICD-10-CM

## 2024-03-07 DIAGNOSIS — M54.2 NECK PAIN: ICD-10-CM

## 2024-03-07 PROCEDURE — 99213 OFFICE O/P EST LOW 20 MIN: CPT | Mod: 25 | Performed by: INTERNAL MEDICINE

## 2024-03-07 PROCEDURE — 99205 OFFICE O/P NEW HI 60 MIN: CPT | Performed by: INTERNAL MEDICINE

## 2024-03-07 PROCEDURE — G2211 COMPLEX E/M VISIT ADD ON: HCPCS | Performed by: INTERNAL MEDICINE

## 2024-03-07 PROCEDURE — 99406 BEHAV CHNG SMOKING 3-10 MIN: CPT | Mod: 25 | Performed by: INTERNAL MEDICINE

## 2024-03-07 PROCEDURE — 93882 EXTRACRANIAL UNI/LTD STUDY: CPT | Mod: RT

## 2024-03-07 PROCEDURE — 93971 EXTREMITY STUDY: CPT | Mod: RT

## 2024-03-07 RX ORDER — ASPIRIN 325 MG
325 TABLET, DELAYED RELEASE (ENTERIC COATED) ORAL DAILY
COMMUNITY

## 2024-03-07 NOTE — PATIENT INSTRUCTIONS
Recent ESR and CRP normal     Complete course of medrol dose haile    Today Venous ultrasound and Rt carotid Ultrasound unremarkable     Follow up with ENT doctor     Quit smoking     Monitor BP and talk to primary for elevated cholesterol etc

## 2024-03-07 NOTE — PROGRESS NOTES
[facility]        Patient is here for a consult.    Pt is currently taking Aspirin.    BP (!) 155/95 (BP Location: Left arm, Patient Position: Chair, Cuff Size: Adult Regular)   Pulse 76   Wt 205 lb (93 kg)   SpO2 97%   BMI 29.84 kg/m      The provider has been notified that the patient has no concerns.     Questions patient would like addressed today are: N/A.    Refills are needed: N/A    Has homecare services and agency name:  Aura Del Rosario MA

## 2024-03-07 NOTE — PROGRESS NOTES
[facility] INITIAL VASCULAR MEDICINE CONSULT    ( New patient Visit)     PRIMARY HEALTH CARE PROVIDER:  Staci Sears MD      REFERRING HEALTH CARE PROVIDER;   Staci Sears MD      REASON FOR CONSULT: Evaluation and management of possible carotidynia      HPI: Daniel Greene is a 38 year old very pleasant male with history of chronic otitis media and cholesteatoma status post left tympanomastoidectomy and bilateral mastoidectomy and left-sided hearing loss with bone-conduction device in place gets periodic ear infections and ear pains developed few days ago right-sided neck pain discomfort swelling which did not go away with conservative management seen and evaluated in the emergency room on February 29, 2024 there was a tenderness on the right lateral neck area, they checked ESR and CRP ESR was normal CRP mildly elevated obtained CT scan soft tissue evaluation which revealed possible carotidynia and initiated Decadron 4 mg 3 times a day for 5 days after he completed the medication his symptoms started coming back he was seen and evaluated by primary care physician yesterday and rechecked inflammatory markers are normal and initiated a Medrol Dosepak and sent here for further evaluation.  He accompanied by his mother today.  He denies any fever chills, night sweats  Right-sided neck is full slightly tender  He has been smoking 5 to 10 cigarettes daily for 15 to 20 years on and off  Previously tried Chantix did not help and he did cold turkey for some time    He is new to me reviewed available records and imaging studies in the epic and updated chart      PAST MEDICAL HISTORY  Past Medical History:   Diagnosis Date    Anxiety     Cholesteatoma 3/18/2015    Bilateral ears    Hearing loss     left ear    Heartburn     PONV (postoperative nausea and vomiting)     Tobacco use 10/6/2015       CURRENT MEDICATIONS  acetaminophen (TYLENOL) 325 MG tablet, Take 325-650 mg by mouth every 6  hours as needed for mild pain  aspirin (ASA) 325 MG EC tablet, Take 325 mg by mouth daily  methylPREDNISolone (MEDROL DOSEPAK) 4 MG tablet therapy pack, Follow Package Directions  dexAMETHasone (DECADRON) 4 MG tablet, Take 1 tablet (4 mg) by mouth 3 times daily for 5 days    No current facility-administered medications on file prior to visit.      PAST SURGICAL HISTORY:  Past Surgical History:   Procedure Laterality Date    LASER DIODE TYMPANOMASTOIDECTOMY Right 11/4/2015    Procedure: LASER DIODE TYMPANOMASTOIDECTOMY;  Surgeon: Augustina Reilly MD;  Location: [facility] OR    MASTOID SURGERY Right 1995    [facility]    MASTOIDECTOMY Left 3/25/2015    Procedure: MASTOIDECTOMY;  Surgeon: Augustina Reilly MD;  Location: [facility] OR       ALLERGIES   No Known Allergies    FAMILY HISTORY  History reviewed. No pertinent family history.    VASCULAR FAMILY HISTORY  1st order relative with atherosclerotic PAD: No  1st order relative with AAA: No  Family history of Familial Hyperlipidemia No  Family History of Hypercoagulable state:No    VASCULAR RISK FACTORS  1. Diabetes:No   2. Smoking: currently smokes.  Advised about smoking cessation.  3. HTN: uncontrolled  4.Hyperlipidemia: No      SOCIAL HISTORY  Social History     Socioeconomic History    Marital status: Single     Spouse name: Not on file    Number of children: Not on file    Years of education: Not on file    Highest education level: Not on file   Occupational History    Not on file   Tobacco Use    Smoking status: Every Day     Types: Cigarettes    Smokeless tobacco: Never   Substance and Sexual Activity    Alcohol use: Yes     Alcohol/week: 0.0 standard drinks of alcohol    Drug use: No    Sexual activity: Yes   Other Topics Concern    Parent/sibling w/ CABG, MI or angioplasty before 65F 55M? Not Asked   Social History Narrative    Not on file     Social Determinants of Health     Financial Resource Strain: Low Risk  (3/6/2024)    Financial Resource Strain     Within  the past 12 months, have you or your family members you live with been unable to get utilities (heat, electricity) when it was really needed?: No   Food Insecurity: Low Risk  (3/6/2024)    Food Insecurity     Within the past 12 months, did you worry that your food would run out before you got money to buy more?: No     Within the past 12 months, did the food you bought just not last and you didn t have money to get more?: No   Transportation Needs: Low Risk  (3/6/2024)    Transportation Needs     Within the past 12 months, has lack of transportation kept you from medical appointments, getting your medicines, non-medical meetings or appointments, work, or from getting things that you need?: No   Physical Activity: Not on file   Stress: Not on file   Social Connections: Not on file   Interpersonal Safety: Not on file   Housing Stability: Low Risk  (3/6/2024)    Housing Stability     Do you have housing? : Yes     Are you worried about losing your housing?: No       ROS:   General: No change in weight, sleep or appetite.  Normal energy.  No fever or chills  Eyes: Negative for vision changes or eye problems  ENT: History of hearing loss, recurrent otitis media and cholesteatoma status post bilateral mastoidectomy and left tympanomastoidectomy, now right lateral neck pain  Resp: No coughing, wheezing or shortness of breath  CV: No chest pains or palpitations  GI: No nausea, vomiting,  heartburn, abdominal pain, diarrhea, constipation or change in bowel habits  : No urinary frequency or dysuria, bladder or kidney problems  Musculoskeletal: No significant muscle or joint pains  Neurologic: No headaches, numbness, tingling, weakness, problems with balance or coordination  Psychiatric: No problems with anxiety, depression or mental health  Heme/immune/allergy: No history of bleeding or clotting problems or anemia.  No allergies or immune system problems  Endocrine: No history of thyroid disease, diabetes or other endocrine  disorders  Skin: No rashes,worrisome lesions or skin problems  Vascular:  No claudication, lifestyle limiting or otherwise; no ischemic rest pain; no non-healing ulcers. No weakness, No loss of sensation      EXAM:  BP (!) 155/95 (BP Location: Left arm, Patient Position: Chair, Cuff Size: Adult Regular)   Pulse 76   Wt 205 lb (93 kg)   SpO2 97%   BMI 29.84 kg/m    In general, the patient is a pleasant male in no apparent distress.    HEENT: NC/AT.  PERRLA.  EOMI.  Sclerae white, not injected.  Nares clear.  Pharynx without erythema or exudate.  Dentition intact.    Neck: No adenopathy.  No thyromegaly. Carotids +2/2 bilaterally without bruits.  No jugular venous distension.   Heart: RRR. Normal S1, S2 splits physiologically. No murmur, rub, click, or gallop. The PMI is in the 5th ICS in the midclavicular line. There is no heave.    Lungs: CTA.  No ronchi, wheezes, rales.  No dullness to percussion.   Abdomen: Soft, nontender, nondistended. No organomegaly. No AAA.  No bruits.   Extremities: No clubbing, cyanosis, or edema.  No wounds.   Palpable symmetrical peripheral pulses in both upper and lower extremities      Labs:  LIPID RESULTS:  Lab Results   Component Value Date    CHOL 238 (H) 06/15/2018    HDL 61 06/15/2018     (H) 06/15/2018    TRIG 228 (H) 06/15/2018       LIVER ENZYME RESULTS:  Lab Results   Component Value Date    AST 57 (H) 06/15/2018     (H) 06/15/2018       CBC RESULTS:  Lab Results   Component Value Date    WBC 10.0 02/29/2024    WBC 10.3 06/15/2018    RBC 4.74 02/29/2024    RBC 4.94 06/15/2018    HGB 14.5 02/29/2024    HGB 15.0 06/15/2018    HCT 43.8 02/29/2024    HCT 44.3 06/15/2018    MCV 92 02/29/2024    MCV 89.6 06/15/2018    MCH 30.6 02/29/2024    MCH 30.4 06/15/2018    MCHC 33.1 02/29/2024    MCHC 33.9 06/15/2018    RDW 11.9 02/29/2024     02/29/2024     06/15/2018       BMP RESULTS:  Lab Results   Component Value Date     02/29/2024      06/15/2018    POTASSIUM 4.7 02/29/2024    POTASSIUM 4.0 06/15/2018    CHLORIDE 103 02/29/2024    CHLORIDE 97 06/15/2018    CO2 22 02/29/2024    ANIONGAP 14 02/29/2024     (H) 02/29/2024    GLC 97 06/15/2018    BUN 13.5 02/29/2024    BUN 12 06/15/2018    BUN 14 06/15/2018    CR 0.68 02/29/2024    CR 0.84 06/15/2018    GFRESTIMATED >90 02/29/2024    NEVILLE 9.6 02/29/2024    NEVILLE 10.3 (H) 06/15/2018          Procedures:       CT SCAN OF THE NECK WITH CONTRAST  2/29/2024 11:07 AM      HISTORY: Right-sided neck pain in the area of the carotid sheath for 2  days.  Evaluate for carotid/jugular abnormality.  Evaluate for occult  infection.     TECHNIQUE: Axial CT images of the neck following administration of  intravenous contrast with reformations. Radiation dose for this scan  was reduced using automated exposure control, adjustment of the mA  and/or kV according to patient size, or iterative reconstruction  technique. 90mL Isovue-370 IV.      COMPARISON: None.      FINDINGS:   Visualized sinuses, nasopharynx and orbits: Unremarkable.      Tongue, oral cavity and oropharynx:  Unremarkable.      Hypopharynx: Unremarkable.      Larynx and trachea: Unremarkable.      Thyroid: No abnormal thyroid gland nodules.      Submandibular glands: Unremarkable.      Parotid glands: Unremarkable.       Lymph nodes: No suspicious enlarged or necrotic cervical lymph nodes.  Scattered nonenlarged bilateral cervical chain lymph nodes are likely  reactive.     Vasculature: The bilateral common and internal carotid arteries are  patent and appear normal. However, there is a very minimal soft tissue  thickening surrounding the right distal common carotid artery, carotid  bulb, and proximal internal carotid artery (for example series 3,  image 72). Normal appearance of the bilateral jugular veins.      Upper mediastinum and lungs: Unremarkable.      Bones: Bilateral canal wall down mastoidectomies. Minimal right  greater than left mastoid  mucosal thickening.                                                                       IMPRESSION:    1. Minimal soft tissue thickening surrounding the right distal common  carotid artery, carotid bulb, and proximal internal carotid artery.  Caliber of the arteries at this level appears normal. These findings  can be seen in the setting of carotidynia given symptoms.         ASAD CATHERINE MD     Assessment and Plan:     1. Carotidynia ???  - Vascular Medicine Referral  - US Carotid Right; Future  2. Neck pain  - US Upper Extremity Venous Duplex Right; Future  3. H/O cholesteatoma  4. History of tympanomastoidectomy Bilateral 2015    Daniel Greene is a 38 year old male, smoker  with history of chronic otitis s/p left tympanomastoidectomy and bilateral mastoidectomy and left-sided hearing loss with bone conduction device in place, recently  presented  to the ED with 3 days of constant right head, neck, ear, and jaw pain. He states it initially presented similar to an ear infection, which he gets chronically, but now the pain radiates down his right neck and jaw and is not resolving. He denies new hearing changes, headache, dysphagia, pharyngitis, fever, or chills. He endorses smoking cigarettes daily and 3-4 drinks/week. Daniel notes he had a recent CAT scan with [facility] that was unremarkable, performed due to feeling a lump in his throat when swallowing, which is now resolved.     He underwent CT scan soft tissue results as delineated above, there is a minimal soft tissue thickening surrounding the distal common carotid artery, carotid bulb and proximal internal carotid artery ?  Suspicion for carotidynia    Lab work done in the ER mildly elevated CRP but ESR was normal he was initiated Decadron 4 mg 3 times a day for 5 days symptoms improved again the started recurring seen by primary physician yesterday underwent ESR and CRP which were normal and was initiated Medrol Dosepak and here today for further  evaluation.    I have obtained venous duplex there is no clot in the IJ or subclavian vein or right upper extremity vein  I also obtained right-sided carotid ultrasound which was unremarkable    Complete the current course of Medrol Dosepak  Quit smoking, offered help he declined see below  Suggested him to follow-up with his  ENT Doctor       5. Cigarette nicotine dependence with other nicotine-induced disorder  (Has been smoking 5 to 10 cigarettes daily for the last 15 to 20 years and quit on and off on his own Chantix did not help in the past, offered help pills, patches, gum, combination and inhalers he wanted to quit on his own, discussed benefits of quitting smoking, educated and info given)    - SMOKING CESSATION COUNSELING, 3-10 MIN      6. Benign essential hypertension  Monitor blood pressure at home if it is persistently elevated discussed with primary at his upcoming visit and consider medication  DASH diet discussed with the patient    7. Hyperlipidemia LDL goal <70  TLC suggested repeat lipid panel through primary care physician if it is elevated consider statin medication      64  minutes spent on the date of the encounter doing chart review, history and exam, documentation, and further activities as noted above.    The longitudinal care of plan for the above diagnoses was addressed during this visit. Due to added complexity of care, we will continue to supprt Daniel Greene and the subsequent management of this/these conditions and with ongoing continuity of care for this/these conditions.      This note was dictated by lysing Dragon software  Thank you for the consultation  Copy of this note to primary care physician      Rozina Mccullough MD,FASTEVEN,FSVM,FNLA, FACP  Vascular Medicine  Clinical Hypertension Specialist   Clinical Lipidologist

## 2024-04-22 SDOH — HEALTH STABILITY: PHYSICAL HEALTH: ON AVERAGE, HOW MANY MINUTES DO YOU ENGAGE IN EXERCISE AT THIS LEVEL?: 10 MIN

## 2024-04-22 SDOH — HEALTH STABILITY: PHYSICAL HEALTH: ON AVERAGE, HOW MANY DAYS PER WEEK DO YOU ENGAGE IN MODERATE TO STRENUOUS EXERCISE (LIKE A BRISK WALK)?: 4 DAYS

## 2024-04-22 ASSESSMENT — SOCIAL DETERMINANTS OF HEALTH (SDOH): HOW OFTEN DO YOU GET TOGETHER WITH FRIENDS OR RELATIVES?: THREE TIMES A WEEK

## 2024-04-23 NOTE — PROGRESS NOTES
Male Preventative Health Visit     SUBJECTIVE:    This 38 year old male presents for a routine preventive physical exam.    The patient has the following concerns:     1. Tobacco abuse : 8- 10 per day cigarettes per day. Not feeling ready to quit.     Patient's medications, allergies, past medical, surgical and family histories were reviewed and updated as appropriate.    Health Maintenance  Health maintenance alerts were reviewed and updated as appropriate.          4/22/2024   General Health   How would you rate your overall physical health? (!) FAIR   Feel stress (tense, anxious, or unable to sleep) To some extent   (!) STRESS CONCERN      4/22/2024   Nutrition   Three or more servings of calcium each day? Yes   Diet: Regular (no restrictions)   How many servings of fruit and vegetables per day? (!) 2-3   How many sweetened beverages each day? (!) 2         4/22/2024   Exercise   Days per week of moderate/strenous exercise 4 days   Average minutes spent exercising at this level 10 min         4/22/2024   Social Factors   Frequency of gathering with friends or relatives Three times a week   Worry food won't last until get money to buy more No   Food not last or not have enough money for food? No   Do you have housing?  Yes   Are you worried about losing your housing? No   Lack of transportation? No   Unable to get utilities (heat,electricity)? No         4/22/2024   Dental   Dentist two times every year? (!) NO         4/22/2024   TB Screening   Were you born outside of the [place]? No     Today's PHQ-2 Score:       3/6/2024     2:30 PM   PHQ-2 ( 1999 [employer])   Q1: Little interest or pleasure in doing things 0   Q2: Feeling down, depressed or hopeless 0   PHQ-2 Score 0         4/22/2024   Substance Use   Alcohol more than 3/day or more than 7/wk No   Do you use any other substances recreationally? (!) DECLINE         4/22/2024   STI Screening   New sexual partner(s) since last STI/HIV test? No         4/22/2024  "  Contraception/Family Planning   Questions about contraception or family planning No       OBJECTIVE:    Vitals:    04/24/24 1016   BP: 128/82   Pulse: 103   SpO2: 97%   Weight: 94.6 kg (208 lb 9.6 oz)   Height: 1.772 m (5' 9.75\")    Body mass index is 30.15 kg/m .    General: no acute distress, cooperative with exam.  HEENT:  PERRLA. Bilateral TM's, external canals, oropharynx normal.    Neck:  Supple, no lymphadenopathy or thyromegaly   Lungs: clear to auscultation bilaterally, normal respiratory effort.  Heart:  RRR without murmurs, rubs or gallops.  Normal S1 and S2.  Abdomen: normal bowel sounds, nontender, no palpable organomegaly.  Skin:  No lesions.  No rashes.  Extremities: warm, perfused, no swelling or edema.  Neuro:  CN II-XII intact, motor & sensory function all intact.    Psych: mental status normal, mood and affect appropriate.    ASSESSMENT / PLAN: This 38 year old male presents for a routine preventive physical exam. Preventive health topics discussed including adequate exercise and healthy diet. Return to clinic in one year for preventative exam or sooner with any other concerns.  Other issues discussed as noted below.    Annual physical exam  BMI 30.0-30.9,adult  -     Lipid Profile ([facility])  -     VENOUS COLLECTION  -     Hemoglobin A1c; Future    Tobacco abuse  8- 10 per day cigarettes per day. Not feeling ready to quit. Declines pneumococcal vaccine after discussion on rational.      H/O cholesteatoma  History of tympanomastoidectomy  History of chronic otitis media and cholesteatoma status post left tympanomastoidectomy and bilateral mastoidectomy and left-sided hearing loss with bone-conduction device in place. Follows with ENT.       "

## 2024-04-24 ENCOUNTER — OFFICE VISIT (OUTPATIENT)
Dept: FAMILY MEDICINE | Facility: CLINIC | Age: 39
End: 2024-04-24

## 2024-04-24 VITALS
BODY MASS INDEX: 29.86 KG/M2 | SYSTOLIC BLOOD PRESSURE: 128 MMHG | WEIGHT: 208.6 LBS | DIASTOLIC BLOOD PRESSURE: 82 MMHG | HEIGHT: 70 IN | OXYGEN SATURATION: 97 % | HEART RATE: 103 BPM

## 2024-04-24 DIAGNOSIS — Z00.00 ANNUAL PHYSICAL EXAM: Primary | ICD-10-CM

## 2024-04-24 DIAGNOSIS — Z72.0 TOBACCO ABUSE: ICD-10-CM

## 2024-04-24 DIAGNOSIS — Z86.69 H/O CHOLESTEATOMA: ICD-10-CM

## 2024-04-24 DIAGNOSIS — R73.03 PRE-DIABETES: ICD-10-CM

## 2024-04-24 DIAGNOSIS — Z98.890 HISTORY OF TYMPANOMASTOIDECTOMY: ICD-10-CM

## 2024-04-24 PROBLEM — H66.93: Status: RESOLVED | Noted: 2022-08-30 | Resolved: 2024-04-24

## 2024-04-24 PROBLEM — B96.89: Status: RESOLVED | Noted: 2022-08-30 | Resolved: 2024-04-24

## 2024-04-24 LAB
CHOLESTEROL: 239 MG/DL (ref 100–199)
FASTING?: YES
HBA1C MFR BLD: 5.7 %
HDL (RMG): 49 MG/DL (ref 40–?)
LDL CALCULATED (RMG): 153 MG/DL (ref 0–130)
TRIGLYCERIDES (RMG): 181 MG/DL (ref 0–149)

## 2024-04-24 PROCEDURE — 83036 HEMOGLOBIN GLYCOSYLATED A1C: CPT | Performed by: FAMILY MEDICINE

## 2024-04-24 PROCEDURE — 80061 LIPID PANEL: CPT | Mod: QW | Performed by: FAMILY MEDICINE

## 2024-04-24 PROCEDURE — 36415 COLL VENOUS BLD VENIPUNCTURE: CPT | Performed by: FAMILY MEDICINE

## 2024-04-24 PROCEDURE — 99395 PREV VISIT EST AGE 18-39: CPT | Performed by: FAMILY MEDICINE

## 2025-05-24 ENCOUNTER — HEALTH MAINTENANCE LETTER (OUTPATIENT)
Age: 40
End: 2025-05-24